# Patient Record
Sex: FEMALE | Race: WHITE | NOT HISPANIC OR LATINO | Employment: UNEMPLOYED | URBAN - METROPOLITAN AREA
[De-identification: names, ages, dates, MRNs, and addresses within clinical notes are randomized per-mention and may not be internally consistent; named-entity substitution may affect disease eponyms.]

---

## 2019-03-14 ENCOUNTER — HOSPITAL ENCOUNTER (OUTPATIENT)
Dept: MAMMOGRAPHY | Facility: HOSPITAL | Age: 49
Discharge: HOME | End: 2019-03-14
Attending: NURSE PRACTITIONER | Admitting: NURSE PRACTITIONER

## 2019-10-15 ENCOUNTER — OFFICE (AMBULATORY)
Dept: URBAN - METROPOLITAN AREA PATHOLOGY 4 | Facility: PATHOLOGY | Age: 49
End: 2019-10-15
Payer: COMMERCIAL

## 2019-10-15 ENCOUNTER — ON CAMPUS - OUTPATIENT (AMBULATORY)
Dept: URBAN - METROPOLITAN AREA HOSPITAL 2 | Facility: HOSPITAL | Age: 49
End: 2019-10-15
Payer: COMMERCIAL

## 2019-10-15 VITALS
SYSTOLIC BLOOD PRESSURE: 130 MMHG | RESPIRATION RATE: 14 BRPM | HEART RATE: 84 BPM | DIASTOLIC BLOOD PRESSURE: 84 MMHG | DIASTOLIC BLOOD PRESSURE: 50 MMHG | SYSTOLIC BLOOD PRESSURE: 96 MMHG | HEART RATE: 74 BPM | HEART RATE: 85 BPM | DIASTOLIC BLOOD PRESSURE: 89 MMHG | SYSTOLIC BLOOD PRESSURE: 119 MMHG | HEART RATE: 68 BPM | WEIGHT: 174.2 LBS | HEART RATE: 70 BPM | HEART RATE: 66 BPM | DIASTOLIC BLOOD PRESSURE: 57 MMHG | OXYGEN SATURATION: 98 % | SYSTOLIC BLOOD PRESSURE: 147 MMHG | DIASTOLIC BLOOD PRESSURE: 47 MMHG | SYSTOLIC BLOOD PRESSURE: 95 MMHG | SYSTOLIC BLOOD PRESSURE: 125 MMHG | DIASTOLIC BLOOD PRESSURE: 52 MMHG | HEIGHT: 64 IN | SYSTOLIC BLOOD PRESSURE: 99 MMHG | DIASTOLIC BLOOD PRESSURE: 59 MMHG | HEART RATE: 75 BPM | OXYGEN SATURATION: 99 % | SYSTOLIC BLOOD PRESSURE: 153 MMHG | SYSTOLIC BLOOD PRESSURE: 115 MMHG | DIASTOLIC BLOOD PRESSURE: 76 MMHG | SYSTOLIC BLOOD PRESSURE: 97 MMHG | RESPIRATION RATE: 16 BRPM | TEMPERATURE: 98.6 F | OXYGEN SATURATION: 100 % | DIASTOLIC BLOOD PRESSURE: 53 MMHG | DIASTOLIC BLOOD PRESSURE: 73 MMHG | HEART RATE: 80 BPM

## 2019-10-15 DIAGNOSIS — Z12.11 ENCOUNTER FOR SCREENING FOR MALIGNANT NEOPLASM OF COLON: ICD-10-CM

## 2019-10-15 DIAGNOSIS — D12.8 BENIGN NEOPLASM OF RECTUM: ICD-10-CM

## 2019-10-15 DIAGNOSIS — K63.5 POLYP OF COLON: ICD-10-CM

## 2019-10-15 PROBLEM — K62.1 RECTAL POLYP: Status: ACTIVE | Noted: 2019-10-15

## 2019-10-15 PROBLEM — D12.5 BENIGN NEOPLASM OF SIGMOID COLON: Status: ACTIVE | Noted: 2019-10-15

## 2019-10-15 LAB
GI HISTOLOGY: A. UNSPECIFIED: (no result)
GI HISTOLOGY: B. UNSPECIFIED: (no result)
GI HISTOLOGY: PDF REPORT: (no result)

## 2019-10-15 PROCEDURE — 45380 COLONOSCOPY AND BIOPSY: CPT | Mod: 33,59 | Performed by: INTERNAL MEDICINE

## 2019-10-15 PROCEDURE — 88305 TISSUE EXAM BY PATHOLOGIST: CPT | Performed by: INTERNAL MEDICINE

## 2019-10-15 PROCEDURE — 45385 COLONOSCOPY W/LESION REMOVAL: CPT | Mod: 33 | Performed by: INTERNAL MEDICINE

## 2020-08-24 ENCOUNTER — OFFICE VISIT (OUTPATIENT)
Dept: CARDIOLOGY | Facility: CLINIC | Age: 50
End: 2020-08-24

## 2020-08-24 VITALS
BODY MASS INDEX: 31.54 KG/M2 | RESPIRATION RATE: 18 BRPM | WEIGHT: 178 LBS | HEIGHT: 63 IN | SYSTOLIC BLOOD PRESSURE: 130 MMHG | DIASTOLIC BLOOD PRESSURE: 77 MMHG | HEART RATE: 82 BPM | OXYGEN SATURATION: 97 %

## 2020-08-24 DIAGNOSIS — R42 DIZZINESS: ICD-10-CM

## 2020-08-24 DIAGNOSIS — Z72.0 TOBACCO USE: ICD-10-CM

## 2020-08-24 DIAGNOSIS — R07.89 CHEST PAIN, ATYPICAL: Primary | ICD-10-CM

## 2020-08-24 DIAGNOSIS — R06.02 SHORTNESS OF BREATH: ICD-10-CM

## 2020-08-24 PROCEDURE — 99204 OFFICE O/P NEW MOD 45 MIN: CPT | Performed by: NURSE PRACTITIONER

## 2020-08-24 RX ORDER — MULTIPLE VITAMINS W/ MINERALS TAB 9MG-400MCG
1 TAB ORAL DAILY
COMMUNITY

## 2020-08-24 RX ORDER — IBUPROFEN 200 MG
200 TABLET ORAL EVERY 6 HOURS PRN
COMMUNITY

## 2020-08-24 RX ORDER — POLYETHYLENE GLYCOL 3350 17 G/17G
17 POWDER, FOR SOLUTION ORAL DAILY
COMMUNITY

## 2020-08-24 RX ORDER — GABAPENTIN 300 MG/1
300 CAPSULE ORAL 3 TIMES DAILY
COMMUNITY

## 2020-08-24 NOTE — PROGRESS NOTES
"Cardiology Office Consultation      Encounter Date:  08/24/2020    Patient ID:   Nicki Amador is a 49 y.o. female.    Reason For Consultation:  Dizziness    History of Present Illness:  Dear Radha Dalton APRN  It was my pleasure to see Ms. Amador in new consultation today.  As you are aware, she is a 49-year-old  female with no known history of ischemic heart disease.  Patient does have history of gastroesophageal reflux disease, elevated serum lipids, recent elevated blood pressure.  She presents today for symptoms of lightheadedness and chest pain.  The patient reports an episode approximately 4 weeks ago when she was getting out of bed and suddenly felt very off balance and lightheaded.  She fell against the wall and was able to finally gain her balance after a few minutes.  She denies any actual presyncopal or syncopal episodes.  She had no chest pains, pressure or palpitations.  She denies any previous diagnosis of vertigo, no nausea or vomiting.  She has noted over the past couple of weeks some shortness with minimal exertion and sensation of her heart \"beating very hard\".  She reports occasional \"pinching sensation\" in her left chest that lasts only a few seconds and subsides.  This may occur with or without activity.  She denies any lower extremity edema or claudication.  The patient has noted slight increase in her blood pressure over the past couple of months with readings as high as 150/90.  She denies any headache, vision change.  Patient does report symptoms of GERD.  She has recent diagnosis of cervical spinal stenosis with episodes of bilateral arm numbness/tingling and is receiving injections.      Assessment & Plan    Impressions:  Chest pain-atypical  Dyspnea with exertion  Lightheadedness  Elevated blood pressure without diagnosis of hypertension  Elevated lipids    Recommendations:  I will schedule patient for nuclear stress testing to evaluate for any evidence of inducible " "ischemia  Patient has had only 2 episodes of lightheadedness-no further.  If patient has any further episodes will order ambulatory monitor  Lipids are being monitored per your office  Consider low-dose antihypertensive if blood pressure remains elevated  Follow-up after stress test    Objective:    Vitals:  Vitals:    08/24/20 1339   BP: 130/77   BP Location: Left arm   Patient Position: Sitting   Cuff Size: Large Adult   Pulse: 82   Resp: 18   SpO2: 97%   Weight: 80.7 kg (178 lb)   Height: 160 cm (63\")       Review of Systems   Cardiovascular: Positive for chest pain, dyspnea on exertion and palpitations.   Musculoskeletal: Positive for back pain.   Neurological: Positive for dizziness and loss of balance.   All other systems reviewed and are negative.      Physical Exam:     General: Alert, cooperative, no distress, appears stated age  Head:  Normocephalic, atraumatic, mucous membranes moist  Eyes:  Conjunctiva/corneas clear, EOM's intact     Neck:  Supple,  no adenopathy;      Lungs: Clear to auscultation bilaterally, no wheezes rhonchi rales are noted  Chest wall: No tenderness  Heart::  Regular rate and rhythm, S1 and S2 normal, no murmur, rub or gallop  Musculoskeletal:   Ambulates freely without assistance  Abdomen: Soft, non-tender, nondistended bowel sounds active  Extremities: No cyanosis, clubbing, or edema  Pulses: 2+ and symmetric all extremities  Skin:  No rashes or lesions  Neuro/psych: A&O x3. CN II through XII are grossly intact with appropriate affect    History of Present Illness      Allergies:  Allergies   Allergen Reactions   • Penicillins Itching and Rash       Medication Review:     Current Outpatient Medications:   •  gabapentin (NEURONTIN) 300 MG capsule, Take 300 mg by mouth 3 (Three) Times a Day., Disp: , Rfl:   •  ibuprofen (ADVIL,MOTRIN) 200 MG tablet, Take 200 mg by mouth Every 6 (Six) Hours As Needed for Mild Pain ., Disp: , Rfl:   •  Multiple Vitamins-Minerals (MULTIVITAMIN WITH " "MINERALS) tablet tablet, Take 1 tablet by mouth Daily., Disp: , Rfl:   •  polyethylene glycol (MIRALAX) packet, Take 17 g by mouth Daily., Disp: , Rfl:     Family History:  Family History   Problem Relation Age of Onset   • Heart attack Mother    • Heart attack Father        Past Medical History:  Past Medical History:   Diagnosis Date   • Hyperlipidemia    • Hypertension        Past surgical History:  Past Surgical History:   Procedure Laterality Date   • INDUCED      • TONSILLECTOMY     • UTERINE SUSPENSION LAPAROSCOPIC         Social History:  Social History     Socioeconomic History   • Marital status:      Spouse name: Not on file   • Number of children: Not on file   • Years of education: Not on file   • Highest education level: Not on file   Tobacco Use   • Smoking status: Current Some Day Smoker     Packs/day: 0.50   • Smokeless tobacco: Never Used   Substance and Sexual Activity   • Alcohol use: Yes     Comment: socially    • Drug use: Yes     Types: Marijuana     Comment: THC gummies       Review of Systems:  The following systems were reviewed as they relate to the cardiovascular system: Constitutional, Eyes, ENT, Cardiovascular, Respiratory, Gastrointestinal, Integumentary, Neurological, Psychiatric, Hematologic, Endocrine, Musculoskeletal, and Genitourinary. The pertinent cardiovascular findings are reported above with all other cardiovascular points within those systems being negative.        NOTE: The following portions of the patient's history were reviewed and updated this visit as appropriate: allergies, current medications, past family history, past medical history, past social history, past surgical history and problem list.    EMR Dragon/Transcription:   \"Dictated utilizing Dragon dictation\".   "

## 2020-09-02 ENCOUNTER — HOSPITAL ENCOUNTER (OUTPATIENT)
Dept: CARDIOLOGY | Facility: HOSPITAL | Age: 50
Discharge: HOME | End: 2020-09-02

## 2020-09-02 ENCOUNTER — HOSPITAL ENCOUNTER (OUTPATIENT)
Dept: CARDIOLOGY | Facility: HOSPITAL | Age: 50
Discharge: HOME | End: 2020-09-02
Admitting: NURSE PRACTITIONER

## 2020-09-02 DIAGNOSIS — R07.89 CHEST PAIN, ATYPICAL: ICD-10-CM

## 2020-09-02 DIAGNOSIS — R42 DIZZINESS: ICD-10-CM

## 2020-09-02 DIAGNOSIS — R06.02 SHORTNESS OF BREATH: ICD-10-CM

## 2020-09-02 LAB
BH CV STRESS BP STAGE 1: NORMAL
BH CV STRESS BP STAGE 2: NORMAL
BH CV STRESS BP STAGE 3: NORMAL
BH CV STRESS DURATION MIN STAGE 1: 3
BH CV STRESS DURATION MIN STAGE 2: 3
BH CV STRESS DURATION MIN STAGE 3: 1
BH CV STRESS DURATION SEC STAGE 1: 0
BH CV STRESS DURATION SEC STAGE 2: 0
BH CV STRESS DURATION SEC STAGE 3: 0
BH CV STRESS GRADE STAGE 1: 10
BH CV STRESS GRADE STAGE 2: 12
BH CV STRESS GRADE STAGE 3: 14
BH CV STRESS HR STAGE 1: 119
BH CV STRESS HR STAGE 2: 144
BH CV STRESS HR STAGE 3: 160
BH CV STRESS METS STAGE 1: 5
BH CV STRESS METS STAGE 2: 7.5
BH CV STRESS METS STAGE 3: 10
BH CV STRESS PROTOCOL 1: NORMAL
BH CV STRESS RECOVERY BP: NORMAL MMHG
BH CV STRESS RECOVERY HR: 88 BPM
BH CV STRESS SPEED STAGE 1: 1.7
BH CV STRESS SPEED STAGE 2: 2.5
BH CV STRESS SPEED STAGE 3: 3.4
BH CV STRESS STAGE 1: 1
BH CV STRESS STAGE 2: 2
BH CV STRESS STAGE 3: 3
LV EF NUC BP: 64 %
MAXIMAL PREDICTED HEART RATE: 170 BPM
PERCENT MAX PREDICTED HR: 94.12 %
STRESS BASELINE BP: NORMAL MMHG
STRESS BASELINE HR: 76 BPM
STRESS PERCENT HR: 111 %
STRESS POST ESTIMATED WORKLOAD: 10.1 METS
STRESS POST EXERCISE DUR MIN: 7 MIN
STRESS POST EXERCISE DUR SEC: 1 SEC
STRESS POST PEAK BP: NORMAL MMHG
STRESS POST PEAK HR: 160 BPM
STRESS TARGET HR: 145 BPM

## 2020-09-02 PROCEDURE — 93017 CV STRESS TEST TRACING ONLY: CPT

## 2020-09-02 PROCEDURE — 0 TECHNETIUM SESTAMIBI: Performed by: NURSE PRACTITIONER

## 2020-09-02 PROCEDURE — 93018 CV STRESS TEST I&R ONLY: CPT | Performed by: INTERNAL MEDICINE

## 2020-09-02 PROCEDURE — A9500 TC99M SESTAMIBI: HCPCS | Performed by: NURSE PRACTITIONER

## 2020-09-02 PROCEDURE — 78452 HT MUSCLE IMAGE SPECT MULT: CPT

## 2020-09-02 PROCEDURE — 78452 HT MUSCLE IMAGE SPECT MULT: CPT | Performed by: INTERNAL MEDICINE

## 2020-09-02 PROCEDURE — 93016 CV STRESS TEST SUPVJ ONLY: CPT | Performed by: INTERNAL MEDICINE

## 2020-09-02 RX ADMIN — TECHNETIUM TC 99M SESTAMIBI 1 DOSE: 1 INJECTION INTRAVENOUS at 09:35

## 2020-09-02 RX ADMIN — TECHNETIUM TC 99M SESTAMIBI 1 DOSE: 1 INJECTION INTRAVENOUS at 08:40

## 2020-09-29 ENCOUNTER — OFFICE VISIT (OUTPATIENT)
Dept: CARDIOLOGY | Facility: CLINIC | Age: 50
End: 2020-09-29

## 2020-09-29 VITALS
SYSTOLIC BLOOD PRESSURE: 122 MMHG | OXYGEN SATURATION: 97 % | DIASTOLIC BLOOD PRESSURE: 83 MMHG | WEIGHT: 176 LBS | BODY MASS INDEX: 31.18 KG/M2 | HEART RATE: 99 BPM

## 2020-09-29 DIAGNOSIS — Z72.0 TOBACCO USE: ICD-10-CM

## 2020-09-29 DIAGNOSIS — R42 DIZZINESS: Primary | ICD-10-CM

## 2020-09-29 DIAGNOSIS — R07.89 CHEST PAIN, ATYPICAL: ICD-10-CM

## 2020-09-29 PROBLEM — R06.02 SHORTNESS OF BREATH: Status: RESOLVED | Noted: 2020-08-24 | Resolved: 2020-09-29

## 2020-09-29 PROCEDURE — 99213 OFFICE O/P EST LOW 20 MIN: CPT | Performed by: NURSE PRACTITIONER

## 2020-09-29 NOTE — PROGRESS NOTES
"  [facility] CARDIOLOGY      REASON FOR FOLLOW-UP:  Follow-up nuclear stress test          Chief Complaint   Patient presents with   • Hypertension     4 week f/u Stress test- pt still having some dizziness    • Hyperlipidemia   • Dizziness         Dear Dr. Jolly,    History of Present Illness   It was my pleasure to see Ms. Amador in new consultation today.  As you are aware, she is a 49-year-old  female with no known history of ischemic heart disease. Patient does have history of gastroesophageal reflux disease, elevated serum lipids, recent elevated blood pressure.  She presented initially for symptoms of lightheadedness and chest pain.  The patient reported an episode approximately 4 weeks ago when she was getting out of bed and suddenly felt very off balance and lightheaded.  She fell against the wall and was able to finally gain her balance after a few minutes.  She denies any actual presyncopal or syncopal episodes.  She had no chest pains, pressure or palpitations.  She denies any previous diagnosis of vertigo, no nausea or vomiting.  She has noted over the past couple of weeks some shortness with minimal exertion and sensation of her heart \"beating very hard\".  She reports occasional \"pinching sensation\" in her left chest that lasts only a few seconds and subsides.  This may occur with or without activity.  She denies any lower extremity edema or claudication.  The patient had noted slight increase in her blood pressure over the past couple of months with readings as high as 150/90.  She denies any headache, vision change.  Patient does report symptoms of GERD.  She has recent diagnosis of cervical spinal stenosis with episodes of bilateral arm numbness/tingling and is receiving injections.  She underwent nuclear stress testing with normal myocardial perfusion study and no evidence of ischemia.  EF estimated at 64%.  Resting anterior wall defect likely secondary to " technical factors.  Low risk study.  She presents today in follow-up for stress testing.    Today, the patient reports no further chest discomfort.  She is receiving injections in her shoulder which is significantly improved her neck and arm pain.  She had one episode of dizziness while leaning forward that resolved quickly.  She reports frequent sinus issues and believes some of her dizziness is related to sinus congestion.  Results of stress testing were reviewed with the patient today.  She stated she probably does not drink enough fluids throughout the day.  Blood pressure in the office today is well controlled at 122/83.         Assessment:  Chest pain-atypical  Dyspnea with exertion  Lightheadedness  Elevated blood pressure without diagnosis of hypertension  Elevated lipids    Plan:  Continue aggressive risk factor modifications  Would appreciate a copy of her most recent labs at your convenience  Continue to monitor blood pressures at home  We will see patient back in 6 months or sooner if needed.        The following portions of the patient's history were reviewed and updated as appropriate: allergies, current medications, past family history, past medical history, past social history, past surgical history and problem list.    REVIEW OF SYSTEMS:    Review of Systems   Musculoskeletal: Positive for joint pain.   Neurological: Positive for dizziness.   All other systems reviewed and are negative.      Vitals:    09/29/20 1306   BP: 122/83   Pulse: 99   SpO2: 97%         PHYSICAL EXAM:    General: Alert, cooperative, no distress, appears stated age  Head:  Normocephalic, atraumatic, mucous membranes moist  Eyes:  Conjunctiva/corneas clear, EOM's intact     Neck:  Supple,  no JVD or bruit     Lungs: Clear to auscultation bilaterally, no wheezes rhonchi rales are noted  Chest wall: No tenderness  Musculoskeletal:   Ambulates freely without assistance  Heart::  Regular rate and rhythm, S1 and S2 normal, no  "murmur, rub or gallop  Abdomen: Soft, non-tender, nondistended, bowel sounds active, no abdominal bruit  Extremities: No cyanosis, clubbing, or edema   Pulses: 2+ and symmetric all extremities  Skin:  No rashes or lesions  Neuro/psych: A&O x3. CN II through XII are grossly intact with appropriate affect        Past Medical History:   Diagnosis Date   • Hyperlipidemia    • Hypertension    • Shortness of breath 2020       Past Surgical History:   Procedure Laterality Date   • INDUCED      • TONSILLECTOMY     • UTERINE SUSPENSION LAPAROSCOPIC           Current Outpatient Medications:   •  gabapentin (NEURONTIN) 300 MG capsule, Take 300 mg by mouth 3 (Three) Times a Day., Disp: , Rfl:   •  ibuprofen (ADVIL,MOTRIN) 200 MG tablet, Take 200 mg by mouth Every 6 (Six) Hours As Needed for Mild Pain ., Disp: , Rfl:   •  Multiple Vitamins-Minerals (MULTIVITAMIN WITH MINERALS) tablet tablet, Take 1 tablet by mouth Daily., Disp: , Rfl:   •  polyethylene glycol (MIRALAX) packet, Take 17 g by mouth Daily., Disp: , Rfl:     Allergies   Allergen Reactions   • Penicillins Itching and Rash       Family History   Problem Relation Age of Onset   • Heart attack Mother    • Heart attack Father        Social History     Tobacco Use   • Smoking status: Current Some Day Smoker     Packs/day: 0.50   • Smokeless tobacco: Never Used   Substance Use Topics   • Alcohol use: Yes     Comment: socially            Current Electrocardiogram:  Procedures        EMR Dragon/Transcription:   \"Dictated utilizing Dragon dictation\".         "

## 2021-04-01 ENCOUNTER — OFFICE VISIT (OUTPATIENT)
Dept: CARDIOLOGY | Facility: CLINIC | Age: 51
End: 2021-04-01

## 2021-04-01 VITALS
WEIGHT: 184 LBS | HEART RATE: 98 BPM | BODY MASS INDEX: 32.59 KG/M2 | OXYGEN SATURATION: 93 % | DIASTOLIC BLOOD PRESSURE: 80 MMHG | SYSTOLIC BLOOD PRESSURE: 117 MMHG

## 2021-04-01 DIAGNOSIS — R42 DIZZINESS: ICD-10-CM

## 2021-04-01 DIAGNOSIS — Z72.0 TOBACCO USE: ICD-10-CM

## 2021-04-01 DIAGNOSIS — R07.89 CHEST PAIN, ATYPICAL: Primary | ICD-10-CM

## 2021-04-01 PROCEDURE — 99213 OFFICE O/P EST LOW 20 MIN: CPT | Performed by: NURSE PRACTITIONER

## 2021-04-01 PROCEDURE — 93000 ELECTROCARDIOGRAM COMPLETE: CPT | Performed by: NURSE PRACTITIONER

## 2021-04-01 NOTE — PROGRESS NOTES
[facility] CARDIOLOGY      REASON FOR FOLLOW-UP:  Chest pain-noncardiac  Elevated blood pressures without the diagnosis of hypertension  Family history of coronary artery disease  Dyslipidemia          Chief Complaint   Patient presents with   • Hypertension     6 mo f/u no cardiac complaints   • Hyperlipidemia         Dear Fidencio Jolly MD        History of Present Illness     It was my pleasure to see Ms. Amador in the office today.  As you are aware, she is a 50-year-old  female with no known history of ischemic heart disease. Patient does have history of gastroesophageal reflux disease, elevated serum lipids, recent elevated blood pressure.  She presented initially for symptoms of lightheadedness and chest pain.  The patient underwent nuclear stress testing with normal myocardial perfusion study and no evidence of ischemia.  EF estimated at 64%.  Resting anterior wall defect likely secondary to technical factors.  Low risk study.  She presents today in follow-up for the above diagnoses.    Today, the patient continues to report pinching sensation in her chest that is unchanged.  She stated she is currently undergoing cervical spine injections and has had continuous pain for the past 3 months.  She brings blood pressure log with her and those are quite good.  She also brings recent lab values with triglycerides 264 and .  She denies any shortness of breath at rest, dyspnea with exertion, orthopnea or PND.  She has occasional mild lower extremity edema that subsides with elevation.  Blood pressure in the office today is well controlled at 117/80.  EKG shows normal sinus rhythm.    Assessment:  Chest pain-atypical  Dyspnea with exertion  Lightheadedness  Elevated blood pressure without diagnosis of hypertension  Dyslipidemia      Plan:  Preventative cardiovascular care and management including diet and exercise, smoking cessation, lipid management  Surveillance labs  at your discretion  Follow-up in 12 months or sooner if needed        The following portions of the patient's history were reviewed and updated as appropriate: allergies, current medications, past family history, past medical history, past social history, past surgical history and problem list.    REVIEW OF SYSTEMS:    Review of Systems   Cardiovascular: Positive for chest pain.   Musculoskeletal:        Neck pain   All other systems reviewed and are negative.      Vitals:    21 1302   BP: 117/80   Pulse: 98   SpO2: 93%         PHYSICAL EXAM:    General: Alert, cooperative, no distress, appears stated age  Head:  Normocephalic, atraumatic, mucous membranes moist  Eyes:  Conjunctiva/corneas clear, EOM's intact     Neck:  Supple,  no JVD or bruit     Lungs: Clear to auscultation bilaterally, no wheezes rhonchi rales are noted  Chest wall: No tenderness  Musculoskeletal:   Ambulates freely without assistance  Heart::  Regular rate and rhythm, S1 and S2 normal, no murmur, rub or gallop  Abdomen: Soft, non-tender, nondistended, bowel sounds active, no abdominal bruit  Extremities: No cyanosis, clubbing, or edema   Pulses: 2+ and symmetric all extremities  Skin:  No rashes or lesions  Neuro/psych: A&O x3. CN II through XII are grossly intact with appropriate affect        Past Medical History:   Diagnosis Date   • Hyperlipidemia    • Hypertension    • Shortness of breath 2020       Past Surgical History:   Procedure Laterality Date   • INDUCED      • TONSILLECTOMY     • UTERINE SUSPENSION LAPAROSCOPIC           Current Outpatient Medications:   •  gabapentin (NEURONTIN) 300 MG capsule, Take 300 mg by mouth 3 (Three) Times a Day., Disp: , Rfl:   •  ibuprofen (ADVIL,MOTRIN) 200 MG tablet, Take 200 mg by mouth Every 6 (Six) Hours As Needed for Mild Pain ., Disp: , Rfl:   •  Multiple Vitamins-Minerals (MULTIVITAMIN WITH MINERALS) tablet tablet, Take 1 tablet by mouth Daily., Disp: , Rfl:   •  polyethylene  "glycol (MIRALAX) packet, Take 17 g by mouth Daily., Disp: , Rfl:     Allergies   Allergen Reactions   • Penicillins Itching and Rash       Family History   Problem Relation Age of Onset   • Heart attack Mother    • Heart attack Father        Social History     Tobacco Use   • Smoking status: Current Some Day Smoker     Packs/day: 1.00   • Smokeless tobacco: Never Used   Substance Use Topics   • Alcohol use: Yes     Comment: socially            Current Electrocardiogram:    ECG 12 Lead    Date/Time: 4/1/2021 1:27 PM  Performed by: Audrey Kirk APRN  Authorized by: Audrey Kirk APRN   Comparison: not compared with previous ECG   Rhythm: sinus rhythm  BPM: 98                  EMR Dragon/Transcription:   \"Dictated utilizing Dragon dictation\".       "

## 2022-04-04 ENCOUNTER — OFFICE VISIT (OUTPATIENT)
Dept: CARDIOLOGY | Facility: CLINIC | Age: 52
End: 2022-04-04

## 2022-04-04 VITALS
SYSTOLIC BLOOD PRESSURE: 135 MMHG | OXYGEN SATURATION: 99 % | HEART RATE: 91 BPM | DIASTOLIC BLOOD PRESSURE: 84 MMHG | HEIGHT: 63 IN | BODY MASS INDEX: 29.77 KG/M2 | WEIGHT: 168 LBS

## 2022-04-04 DIAGNOSIS — R07.89 CHEST PAIN, ATYPICAL: Primary | ICD-10-CM

## 2022-04-04 DIAGNOSIS — R42 DIZZINESS: ICD-10-CM

## 2022-04-04 DIAGNOSIS — R53.83 OTHER FATIGUE: ICD-10-CM

## 2022-04-04 DIAGNOSIS — Z72.0 TOBACCO USE: ICD-10-CM

## 2022-04-04 PROCEDURE — 93000 ELECTROCARDIOGRAM COMPLETE: CPT | Performed by: NURSE PRACTITIONER

## 2022-04-04 PROCEDURE — 99213 OFFICE O/P EST LOW 20 MIN: CPT | Performed by: NURSE PRACTITIONER

## 2022-04-04 NOTE — PROGRESS NOTES
[facility] CARDIOLOGY      REASON FOR FOLLOW-UP:  Chest pain-noncardiac  Elevated blood pressures without the diagnosis of hypertension  Family history of coronary artery disease  Dyslipidemia          Chief Complaint   Patient presents with   • Hypertension   • Hyperlipidemia         Dear Fidencio Jolly MD        History of Present Illness     It was my pleasure to see Ms. Amador in the office today.  As you are aware, she is a 51-year-old female with no known history of ischemic heart disease. Patient does have history of gastroesophageal reflux disease, elevated serum lipids, recent elevated blood pressure.  She presented initially and August 2020 for symptoms of lightheadedness and chest pain.  She underwent nuclear stress testing with normal myocardial perfusion study and no evidence of ischemia.  EF estimated at 64%.  Resting anterior wall defect likely secondary to technical factors given normal wall motion.  She presents today in follow-up for the above-mentioned diagnoses.    Today, Ms. Amador reports that she contracted COVID-19 infection in January of this year with shortness of breath on exertion and intermittent elevated heart rates with activity.  She has had some ongoing symptoms of fatigue and lack of energy.  She did have some chest tightness during her actual infectious process, but that has subsided.  She denies any shortness of breath, dyspnea on exertion, dizziness or lightheadedness.  She has had no lower extremity edema.  She has had no further chest discomfort.  EKG in the office today shows normal sinus rhythm    ASSESSMENT:  Chest pain-atypical  Shortness of breath on exertion  Elevated blood pressure without diagnosis of hypertension  Dyslipidemia        PLAN:  Preventative cardiovascular care and management including diet and exercise, recommend smoking cessation.  Surveillance labs at your discretion  Follow-up in 12 months or sooner if needed        The  following portions of the patient's history were reviewed and updated as appropriate: allergies, current medications, past family history, past medical history, past social history, past surgical history and problem list.    REVIEW OF SYSTEMS:    Review of Systems   Constitutional: Positive for malaise/fatigue.   All other systems reviewed and are negative.      Vitals:    22 1259   BP: 135/84   Pulse: 91   SpO2: 99%         PHYSICAL EXAM:    General: Alert, cooperative, no distress, appears stated age  Head:  Normocephalic, atraumatic, mucous membranes moist  Eyes:  Conjunctiva/corneas clear, EOM's intact     Neck:  Supple,  no JVD or bruit     Lungs: Clear to auscultation bilaterally, no wheezes rhonchi rales are noted  Chest wall: No tenderness  Musculoskeletal:   Ambulates freely without assistance  Heart::  Regular rate and rhythm, S1 and S2 normal, no murmur, rub or gallop  Abdomen: Soft, non-tender, nondistended, bowel sounds active, no abdominal bruit  Extremities: No cyanosis, clubbing, or edema   Pulses: 2+ and symmetric all extremities  Skin:  No rashes or lesions  Neuro/psych: A&O x3. CN II through XII are grossly intact with appropriate affect        Past Medical History:   Diagnosis Date   • Hyperlipidemia    • Hypertension    • Shortness of breath 2020       Past Surgical History:   Procedure Laterality Date   • INDUCED      • TONSILLECTOMY     • UTERINE SUSPENSION LAPAROSCOPIC           Current Outpatient Medications:   •  gabapentin (NEURONTIN) 300 MG capsule, Take 300 mg by mouth 3 (Three) Times a Day., Disp: , Rfl:   •  ibuprofen (ADVIL,MOTRIN) 200 MG tablet, Take 200 mg by mouth Every 6 (Six) Hours As Needed for Mild Pain ., Disp: , Rfl:   •  Multiple Vitamins-Minerals (MULTIVITAMIN WITH MINERALS) tablet tablet, Take 1 tablet by mouth Daily., Disp: , Rfl:   •  polyethylene glycol (MIRALAX) packet, Take 17 g by mouth Daily., Disp: , Rfl:     Allergies   Allergen Reactions   •  "Penicillins Itching and Rash       Family History   Problem Relation Age of Onset   • Heart attack Mother    • Heart attack Father        Social History     Tobacco Use   • Smoking status: Current Some Day Smoker     Packs/day: 1.00   • Smokeless tobacco: Never Used   Substance Use Topics   • Alcohol use: Yes     Comment: socially            Current Electrocardiogram:    ECG 12 Lead    Date/Time: 4/4/2022 1:46 PM  Performed by: Audrey Kirk APRN  Authorized by: Audrey Kirk APRN   Comparison: compared with previous ECG from 4/1/2021  Similar to previous ECG  Rhythm: sinus rhythm  BPM: 91                  EMR Dragon/Transcription:   \"Dictated utilizing Dragon dictation\".       "

## 2023-10-09 ENCOUNTER — TREATMENT (OUTPATIENT)
Dept: PHYSICAL THERAPY | Facility: CLINIC | Age: 53
End: 2023-10-09
Payer: COMMERCIAL

## 2023-10-09 DIAGNOSIS — M16.0 OSTEOARTHRITIS OF BOTH HIPS, UNSPECIFIED OSTEOARTHRITIS TYPE: Primary | ICD-10-CM

## 2023-10-09 DIAGNOSIS — M70.71 BURSITIS OF RIGHT HIP, UNSPECIFIED BURSA: ICD-10-CM

## 2023-10-09 DIAGNOSIS — M25.551 BILATERAL HIP PAIN: ICD-10-CM

## 2023-10-09 DIAGNOSIS — R26.2 DIFFICULTY WALKING: ICD-10-CM

## 2023-10-09 DIAGNOSIS — M25.552 BILATERAL HIP PAIN: ICD-10-CM

## 2023-10-09 PROCEDURE — 97140 MANUAL THERAPY 1/> REGIONS: CPT | Performed by: PHYSICAL THERAPIST

## 2023-10-09 PROCEDURE — 97161 PT EVAL LOW COMPLEX 20 MIN: CPT | Performed by: PHYSICAL THERAPIST

## 2023-10-09 NOTE — PROGRESS NOTES
"Physical Therapy Initial Evaluation and Plan of Care  [address] [address]    Patient: Nicki Amador   : 1970  Diagnosis/ICD-10 Code:  Osteoarthritis of both hips, unspecified osteoarthritis type [M16.0]  Referring practitioner: Akash Bahena MD  Date of Initial Visit: 10/9/2023  Today's Date: 10/9/2023  Patient seen for 1 session         Visit Diagnoses:     ICD-10-CM ICD-9-CM   1. Osteoarthritis of both hips, unspecified osteoarthritis type  M16.0 715.95   2. Bursitis of right hip, unspecified bursa  M70.71 726.5   3. Bilateral hip pain  M25.551 719.45    M25.552    4. Difficulty walking  R26.2 719.7       Subjective Questionnaire: LEFS: 37/80 points    Subjective Evaluation    History of Present Illness  Mechanism of injury: Patient presents to physical therapy with orders to address bilateral hip pain due to bilateral osteoarthritis and bursitis of the right hip. Her hip pain started on 7-8 weeks ago that had insidious onset. Approximately a year ago she was trying to get into bed and felt like she moved wrong. She reports that her hip hasn't felt the same since then. She got an injection approximately 3 weeks ago in her right hip and reports that her hip pain is better. Currently her right hip is the one that is hurting.     Her pain goes from her right low back (which feels like muscle tightness to her), to her right pelvis (which is tender), and her right groin (which only hurts when she starts doing activities).     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 Her work gave her a sit to stand desk so she can stand during work day with support. She has increased pain after prolonged sitting.     The patient has difficulty doing laundry and dishes. She says that she is unable to do anything past that due to how the other activities have gone poorly. Currently she states that sleep is going \"fine\".     The patient " "would like to get back to being able to hike, be active with her 4 year old granddaughter, do household cleaning, and bend over and squat without being in pain.     She previously went to PT for shoulder and neck pain but only received 5 visits. She felt like PT wasn't helping but states that it was due to her only having 5 visits. She is not currently doing her previous HEP due to work and length of HEP (2.5 hours in total).     Medial history: hyperlipidemia, hypertension,   Medication allergies: Penicillin      Patient Occupation:  (does computer work) Pain  Current pain ratin  At best pain ratin  At worst pain rating: 10 (\" when it was going\")  Location: Low back, pelvic, groin  Quality: sharp (and tender)  Relieving factors: ice and medications (asprin)  Aggravating factors: repetitive movement, movement, ambulation, stairs, squatting, prolonged positioning, standing and lifting (prolonged sitting)  Progression: improved (since injection)    Social Support  Lives in: one-story house (one little step into the shower)  Lives with: significant other (boyfriend)    Treatments  Previous treatment: physical therapy (for shoulder & neck)  Current treatment: injection treatment and physical therapy  Patient Goals  Patient goals for therapy: decreased pain, increased motion, increased strength, return to sport/leisure activities, improved balance and independence with ADLs/IADLs  Patient goal: return to hiking, be active with her 4 year old granddaughter         Objective          Static Posture     Pelvis   Pelvis (Right): Obliquity.     Comments  There was a slight leg length discrepancy with the right leg being shorter.      Postural Observations  Seated posture: fair  Standing posture: fair      Palpation   Left   Hypertonic in the erector spinae, lumbar paraspinals and quadratus lumborum.   Tenderness of the quadratus lumborum.     Right   Hypertonic in the erector spinae, lumbar " paraspinals, quadratus lumborum and rectus femoris. Tenderness of the gluteus cheryl, gluteus medius, piriformis and quadratus lumborum.     Active Range of Motion     Lumbar   Flexion: 75 (pain only at 45 deg) degrees   Extension: 5 (felt tightness) degrees   Left Hip   Flexion: 108 degrees     Right Hip   Flexion: 104 degrees     Passive Range of Motion   Left Hip   External rotation (90/90): 60 degrees   Internal rotation (90/90): 25 degrees     Right Hip   External rotation (90/90): 48 degrees   Internal rotation (90/90): 24 degrees     Strength/Myotome Testing     Left Hip   Planes of Motion   Flexion: 4    Right Hip   Planes of Motion   Flexion: 3+    Left Knee   Flexion: 4+  Extension: 4+    Right Knee   Flexion: 4  Extension: 4    Left Ankle/Foot   Dorsiflexion: 4+  Plantar flexion: 3+    Right Ankle/Foot   Dorsiflexion: 4+  Plantar flexion: 3+    Tests     Additional Tests Details  Positive Stork Test Bilat    Ambulation     Observational Gait   Gait: antalgic      General Comments     Hip Comments   Bilateral limited internal rotation      Patient Education: Discussed treatment plan and initiated HEP with handout and [employer] access code: [email or web address]    Assessment & Plan       Assessment  Impairments: abnormal gait, abnormal muscle firing, abnormal muscle tone, abnormal or restricted ROM, activity intolerance, impaired balance, impaired physical strength, lacks appropriate home exercise program, pain with function and weight-bearing intolerance   Functional limitations: carrying objects, lifting, sleeping, walking, pulling, pushing, uncomfortable because of pain, sitting, standing, stooping, reaching overhead and unable to perform repetitive tasks   Assessment details: The patient is a 52 y.o. female who presents to physical therapy today for bilateral hip pain that is primarily on her right side. Upon initial evaluation, the patient demonstrates the following impairments: abnormal muscle firing, muscle  tone, gait, and ROM; activity and weight bearing intolerance; impaired balance and strength; lacking an appropriate home exercise program; and pain with function. Due to these impairments, the patient is unable to carry objects, push or pull, sit for prolonged periods of time, stoop, stand, reach overhead, obtain quality sleep, walk, lift, perform repetitive tasks, and is uncomfortable because of the pain. The patient would benefit from skilled PT services to address functional limitations and impairments and to improve patient quality of life.     Barriers to therapy: potential limitation in insurance covered visits    Goals  Plan Goals: STG's: 3 weeks  Patient will report a decrease in maximum pain by 25%   Patient will be able to pick a light (<5#) object off the floor without an increase in pain  Patient will be able to tolerate standing for >/= 15 minutes without increased pain  Patient will be able to perform HEP with minimal verbal cues    LTG's: By discharge  Patient will report a decrease in maximum pain by 50%  Patient will demonstrate an improvement in overall function as measured by an improvement in the LEFS score by >/= 10 points   Patient will be able to pick a mildly heavy (approximately 15#) object off the floor without an increase in pain in order to be able to lift a filled laundry basket or cleaning supplies  Patient will be able to stoop for >/= 20 minutes at a time without increased pain in order to clean around the house  Patient will be able to tolerate standing for >/= 45 minutes without increased pain in order to be able to stand at work or do tasks around the home  Patient will be able to tolerate sitting for >/= 90 minutes without increased pain in order to be able to sit for longer at work  Patient will be able to hike on a relatively mildly difficult trail for >/= 30 minutes without increased pain  Patient will be able to be active with her granddaughter for >/= 30 minutes at a time  without increased pain  Patient will be independent with final HEP      Plan  Therapy options: will be seen for skilled therapy services  Planned modality interventions: cryotherapy, dry needling, electrical stimulation/Russian stimulation, TENS, thermotherapy (hydrocollator packs), hydrotherapy and ultrasound  Planned therapy interventions: balance/weight-bearing training, body mechanics training, flexibility, functional ROM exercises, home exercise program, IADL retraining, gait training, manual therapy, neuromuscular re-education, postural training, soft tissue mobilization, strengthening, stretching, therapeutic activities, ADL retraining, motor coordination training and abdominal trunk stabilization  Frequency: 2x week  Duration in weeks: 12  Treatment plan discussed with: patient        History # of Personal Factors and/or Comorbidities: MODERATE (1-2)  Examination of Body System(s): # of elements: LOW (1-2)  Clinical Presentation: EVOLVING  Clinical Decision Making: LOW     I was present in the room guiding the student, directing the service, and making the skilled judgement for all services rendered.    Timed:         Manual Therapy:    10     mins  90325;     Therapeutic Exercise:      5   mins  79599;     Neuromuscular Ricardo:        mins  64387;    Therapeutic Activity:          mins  20839;     Gait Training:           mins  75383;     Ultrasound:          mins  18681;    Ionto                                   mins   32894  Self Care                            mins   93419    Un-Timed:  Electrical Stimulation:         mins  49344 ( );  Canalith Repos         mins 26479  Dry Needling          mins 20812/10060  Traction          mins 14957  Low Eval     35     Mins  87187  Mod Eval          Mins  64472  High Eval                            Mins  71153    No Charge:   Cryopack                         mins  Moist Heat                       mins      Timed Treatment:   15   mins   Total Treatment:      50   mins        Monica Tian, Student Physical Therapist        PT SIGNATURE: Selina Bowen PT   [place] License: [id number]  DATE TREATMENT INITIATED: 10/9/2023    Initial Certification  Certification Period: 10/9/2023 thru 1/6/2024  I certify that the therapy services are furnished while this patient is under my care.  The services outlined above are required by this patient, and will be reviewed every 90 days.      Physician Signature: _________________________  PHYSICIAN: Akash Bahena MD   NPI: [id number]                                             DATE: _____________________________________    Please sign and return via fax to [phone]. Thank you, [facility].

## 2023-10-09 NOTE — LETTER
"Physical Therapy Initial Evaluation and Plan of Care  [address] [address]    Patient: Nicki Amador   : 1970  Diagnosis/ICD-10 Code:  Osteoarthritis of both hips, unspecified osteoarthritis type [M16.0]  Referring practitioner: Akash Bahena MD  Date of Initial Visit: 10/9/2023  Today's Date: 10/9/2023  Patient seen for 1 session         Visit Diagnoses:     ICD-10-CM ICD-9-CM   1. Osteoarthritis of both hips, unspecified osteoarthritis type  M16.0 715.95   2. Bursitis of right hip, unspecified bursa  M70.71 726.5   3. Bilateral hip pain  M25.551 719.45    M25.552    4. Difficulty walking  R26.2 719.7       Subjective Questionnaire: LEFS: 37/80 points    Subjective Evaluation    History of Present Illness  Mechanism of injury: Patient presents to physical therapy with orders to address bilateral hip pain due to bilateral osteoarthritis and bursitis of the right hip. Her hip pain started on 7-8 weeks ago that had insidious onset. Approximately a year ago she was trying to get into bed and felt like she moved wrong. She reports that her hip hasn't felt the same since then. She got an injection approximately 3 weeks ago in her right hip and reports that her hip pain is better. Currently her right hip is the one that is hurting.     Her pain goes from her right low back (which feels like muscle tightness to her), to her right pelvis (which is tender), and her right groin (which only hurts when she starts doing activities).     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 Her work gave her a sit to stand desk so she can stand during work day with support. She has increased pain after prolonged sitting.     The patient has difficulty doing laundry and dishes. She says that she is unable to do anything past that due to how the other activities have gone poorly. Currently she states that sleep is going \"fine\".     The patient " "would like to get back to being able to hike, be active with her 4 year old granddaughter, do household cleaning, and bend over and squat without being in pain.     She previously went to PT for shoulder and neck pain but only received 5 visits. She felt like PT wasn't helping but states that it was due to her only having 5 visits. She is not currently doing her previous HEP due to work and length of HEP (2.5 hours in total).     Medial history: hyperlipidemia, hypertension,   Medication allergies: Penicillin      Patient Occupation:  (does computer work) Pain  Current pain ratin  At best pain ratin  At worst pain rating: 10 (\" when it was going\")  Location: Low back, pelvic, groin  Quality: sharp (and tender)  Relieving factors: ice and medications (asprin)  Aggravating factors: repetitive movement, movement, ambulation, stairs, squatting, prolonged positioning, standing and lifting (prolonged sitting)  Progression: improved (since injection)    Social Support  Lives in: one-story house (one little step into the shower)  Lives with: significant other (boyfriend)    Treatments  Previous treatment: physical therapy (for shoulder & neck)  Current treatment: injection treatment and physical therapy  Patient Goals  Patient goals for therapy: decreased pain, increased motion, increased strength, return to sport/leisure activities, improved balance and independence with ADLs/IADLs  Patient goal: return to hiking, be active with her 4 year old granddaughter         Objective          Static Posture     Pelvis   Pelvis (Right): Obliquity.     Comments  There was a slight leg length discrepancy with the right leg being shorter.      Postural Observations  Seated posture: fair  Standing posture: fair      Palpation   Left   Hypertonic in the erector spinae, lumbar paraspinals and quadratus lumborum.   Tenderness of the quadratus lumborum.     Right   Hypertonic in the erector spinae, lumbar " paraspinals, quadratus lumborum and rectus femoris. Tenderness of the gluteus cheryl, gluteus medius, piriformis and quadratus lumborum.     Active Range of Motion     Lumbar   Flexion: 75 (pain only at 45 deg) degrees   Extension: 5 (felt tightness) degrees   Left Hip   Flexion: 108 degrees     Right Hip   Flexion: 104 degrees     Passive Range of Motion   Left Hip   External rotation (90/90): 60 degrees   Internal rotation (90/90): 25 degrees     Right Hip   External rotation (90/90): 48 degrees   Internal rotation (90/90): 24 degrees     Strength/Myotome Testing     Left Hip   Planes of Motion   Flexion: 4    Right Hip   Planes of Motion   Flexion: 3+    Left Knee   Flexion: 4+  Extension: 4+    Right Knee   Flexion: 4  Extension: 4    Left Ankle/Foot   Dorsiflexion: 4+  Plantar flexion: 3+    Right Ankle/Foot   Dorsiflexion: 4+  Plantar flexion: 3+    Tests     Additional Tests Details  Positive Stork Test Bilat    Ambulation     Observational Gait   Gait: antalgic      General Comments     Hip Comments   Bilateral limited internal rotation      Patient Education: Discussed treatment plan and initiated HEP with handout and [employer] access code: [email or web address]    Assessment & Plan       Assessment  Impairments: abnormal gait, abnormal muscle firing, abnormal muscle tone, abnormal or restricted ROM, activity intolerance, impaired balance, impaired physical strength, lacks appropriate home exercise program, pain with function and weight-bearing intolerance   Functional limitations: carrying objects, lifting, sleeping, walking, pulling, pushing, uncomfortable because of pain, sitting, standing, stooping, reaching overhead and unable to perform repetitive tasks   Assessment details: The patient is a 52 y.o. female who presents to physical therapy today for bilateral hip pain that is primarily on her right side. Upon initial evaluation, the patient demonstrates the following impairments: abnormal muscle firing, muscle  tone, gait, and ROM; activity and weight bearing intolerance; impaired balance and strength; lacking an appropriate home exercise program; and pain with function. Due to these impairments, the patient is unable to carry objects, push or pull, sit for prolonged periods of time, stoop, stand, reach overhead, obtain quality sleep, walk, lift, perform repetitive tasks, and is uncomfortable because of the pain. The patient would benefit from skilled PT services to address functional limitations and impairments and to improve patient quality of life.     Barriers to therapy: potential limitation in insurance covered visits    Goals  Plan Goals: STG's: 3 weeks  Patient will report a decrease in maximum pain by 25%   Patient will be able to pick a light (<5#) object off the floor without an increase in pain  Patient will be able to tolerate standing for >/= 15 minutes without increased pain  Patient will be able to perform HEP with minimal verbal cues    LTG's: By discharge  Patient will report a decrease in maximum pain by 50%  Patient will demonstrate an improvement in overall function as measured by an improvement in the LEFS score by >/= 10 points   Patient will be able to pick a mildly heavy (approximately 15#) object off the floor without an increase in pain in order to be able to lift a filled laundry basket or cleaning supplies  Patient will be able to stoop for >/= 20 minutes at a time without increased pain in order to clean around the house  Patient will be able to tolerate standing for >/= 45 minutes without increased pain in order to be able to stand at work or do tasks around the home  Patient will be able to tolerate sitting for >/= 90 minutes without increased pain in order to be able to sit for longer at work  Patient will be able to hike on a relatively mildly difficult trail for >/= 30 minutes without increased pain  Patient will be able to be active with her granddaughter for >/= 30 minutes at a time  without increased pain  Patient will be independent with final HEP      Plan  Therapy options: will be seen for skilled therapy services  Planned modality interventions: cryotherapy, dry needling, electrical stimulation/Russian stimulation, TENS, thermotherapy (hydrocollator packs), hydrotherapy and ultrasound  Planned therapy interventions: balance/weight-bearing training, body mechanics training, flexibility, functional ROM exercises, home exercise program, IADL retraining, gait training, manual therapy, neuromuscular re-education, postural training, soft tissue mobilization, strengthening, stretching, therapeutic activities, ADL retraining, motor coordination training and abdominal trunk stabilization  Frequency: 2x week  Duration in weeks: 12  Treatment plan discussed with: patient        History # of Personal Factors and/or Comorbidities: MODERATE (1-2)  Examination of Body System(s): # of elements: LOW (1-2)  Clinical Presentation: EVOLVING  Clinical Decision Making: LOW     I was present in the room guiding the student, directing the service, and making the skilled judgement for all services rendered.    Timed:         Manual Therapy:    10     mins  61405;     Therapeutic Exercise:      5   mins  69369;     Neuromuscular Ricardo:        mins  57350;    Therapeutic Activity:          mins  45069;     Gait Training:           mins  12533;     Ultrasound:          mins  49026;    Ionto                                   mins   58804  Self Care                            mins   94530    Un-Timed:  Electrical Stimulation:         mins  79913 ( );  Canalith Repos         mins 69243  Dry Needling          mins 02319/16918  Traction          mins 63913  Low Eval     35     Mins  58660  Mod Eval          Mins  88269  High Eval                            Mins  19821    No Charge:   Cryopack                         mins  Moist Heat                       mins      Timed Treatment:   15   mins   Total Treatment:      50   mins        Monica Tian, Student Physical Therapist        PT SIGNATURE: Selina Bowen PT   [place] License: [id number]  DATE TREATMENT INITIATED: 10/9/2023    Initial Certification  Certification Period: 10/9/2023 thru 1/6/2024  I certify that the therapy services are furnished while this patient is under my care.  The services outlined above are required by this patient, and will be reviewed every 90 days.      Physician Signature: _________________________  PHYSICIAN: Akash Bahena MD   NPI: [id number]                                             DATE: _____________________________________    Please sign and return via fax to [phone]. Thank you, [facility].

## 2023-10-11 ENCOUNTER — TREATMENT (OUTPATIENT)
Dept: PHYSICAL THERAPY | Facility: CLINIC | Age: 53
End: 2023-10-11
Payer: COMMERCIAL

## 2023-10-11 DIAGNOSIS — M16.0 OSTEOARTHRITIS OF BOTH HIPS, UNSPECIFIED OSTEOARTHRITIS TYPE: Primary | ICD-10-CM

## 2023-10-11 DIAGNOSIS — M25.551 BILATERAL HIP PAIN: ICD-10-CM

## 2023-10-11 DIAGNOSIS — M70.71 BURSITIS OF RIGHT HIP, UNSPECIFIED BURSA: ICD-10-CM

## 2023-10-11 DIAGNOSIS — M25.552 BILATERAL HIP PAIN: ICD-10-CM

## 2023-10-11 DIAGNOSIS — R26.2 DIFFICULTY WALKING: ICD-10-CM

## 2023-10-11 PROCEDURE — 97110 THERAPEUTIC EXERCISES: CPT | Performed by: PHYSICAL THERAPIST

## 2023-10-11 NOTE — PROGRESS NOTES
Physical Therapy Daily Treatment Note  [address] [place] [address]      Patient: Nicki Amador  : 1970  Referring Practitioner: Akash Bahena MD  Date of Initial Visit: Type: THERAPY  Noted: 10/9/2023  Today's Date: 10/11/2023  Patient seen for: 2 sessions      Visit Diagnoses:     ICD-10-CM ICD-9-CM   1. Osteoarthritis of both hips, unspecified osteoarthritis type  M16.0 715.95   2. Bursitis of right hip, unspecified bursa  M70.71 726.5   3. Bilateral hip pain  M25.551 719.45    M25.552    4. Difficulty walking  R26.2 719.7       Subjective   Nicki Amador reports: she is doing well, pain is minimal today. States she was sore following, but that has since improved. She reports that she has been compliant with her initial HEP, and can tell it is helping.     Pain Level (0-10): 0    Objective     See Exercise, Manual, and Modality Logs for complete treatment.     Patient Education: cues for form and addition of ITB off side of bed to HEP.     Assessment & Plan       Assessment  Assessment details: Nicki presents to PT voicing overall improvements in pain since having injection, but does report muscle soreness and quick fatigue. Progressed therex today with TrA engagement. Also added ITB stretch as pt reporting increased soreness on the lateral aspect of the R hip. Continue to progress as tolerated.       Progress per Plan of Care and Progress strengthening /stabilization /functional activity          Timed:            Manual Therapy:         mins  76128;     Therapeutic Exercise:    25     mins  34784;     Neuromuscular Ricardo:        mins  01695;    Therapeutic Activity:     10     mins  66968;     Gait Training:           mins  96040;     Ultrasound:          mins  33984;    Ionto                                   mins   74699  Self Care                            mins   41770      Un-Timed:  Electrical Stimulation:         mins  86462 ( );  Traction          mins  59069    No Charge:   Cryopack:        mins  Hydrocollator MHP:         mins      Timed Treatment:   35   mins   Total Treatment:     35   mins      Charo Quiñones PTA  Physical Therapist Assistant  [place] License: [id number]

## 2023-10-16 ENCOUNTER — TREATMENT (OUTPATIENT)
Dept: PHYSICAL THERAPY | Facility: CLINIC | Age: 53
End: 2023-10-16
Payer: COMMERCIAL

## 2023-10-16 DIAGNOSIS — M25.552 BILATERAL HIP PAIN: ICD-10-CM

## 2023-10-16 DIAGNOSIS — M16.0 OSTEOARTHRITIS OF BOTH HIPS, UNSPECIFIED OSTEOARTHRITIS TYPE: Primary | ICD-10-CM

## 2023-10-16 DIAGNOSIS — R26.2 DIFFICULTY WALKING: ICD-10-CM

## 2023-10-16 DIAGNOSIS — M25.551 BILATERAL HIP PAIN: ICD-10-CM

## 2023-10-16 DIAGNOSIS — M70.71 BURSITIS OF RIGHT HIP, UNSPECIFIED BURSA: ICD-10-CM

## 2023-10-16 PROCEDURE — 97530 THERAPEUTIC ACTIVITIES: CPT | Performed by: PHYSICAL THERAPIST

## 2023-10-16 PROCEDURE — 97110 THERAPEUTIC EXERCISES: CPT | Performed by: PHYSICAL THERAPIST

## 2023-10-16 PROCEDURE — 97112 NEUROMUSCULAR REEDUCATION: CPT | Performed by: PHYSICAL THERAPIST

## 2023-10-16 NOTE — PROGRESS NOTES
Physical Therapy Daily Treatment Note  [address] [place] [address]      Patient: Nicki Amador  : 1970  Referring Practitioner: Akash Bahena MD  Date of Initial Visit: Type: THERAPY  Noted: 10/9/2023  Today's Date: 10/16/2023  Patient seen for: 3 sessions      Visit Diagnoses:     ICD-10-CM ICD-9-CM   1. Osteoarthritis of both hips, unspecified osteoarthritis type  M16.0 715.95   2. Bursitis of right hip, unspecified bursa  M70.71 726.5   3. Bilateral hip pain  M25.551 719.45    M25.552    4. Difficulty walking  R26.2 719.7       Subjective   Nicki Amador reports she had a very busy weekend between her birthday, fall festivities and general house work. She reports that she was able to perform her HEP x 1, but was not able to do more than that. She was able to complete 3 full loads of laundry before experiencing pain, which is an improvement overall. As of right now, she has 0 pain.     Pain Level (0-10): 0    Objective     See Exercise, Manual, and Modality Logs for complete treatment.     Patient Education: cues for form and HEP progression today. Anatomy of iliopsoas and how it can cause discomfort on the anterolateral portion of the pelvis and spine.     Assessment & Plan       Assessment  Assessment details: Nicki presents to PT voicing that she is pain free today! She is able to perform current ex regimen with focus on NMC of core stabilizers with dynamic motions. HEP progressed. If she continues to remain pain free, plan is to further progress to next level of core stabilizing exs.        Progress per Plan of Care and Progress strengthening /stabilization /functional activity          Timed:            Manual Therapy:         mins  21949;     Therapeutic Exercise:    15     mins  00018;     Neuromuscular Ricardo:   13     mins  44170;    Therapeutic Activity:     10     mins  03443;     Gait Training:           mins  00518;     Ultrasound:          mins  78036;     Ionto                                   mins   03569  Self Care                            mins   51639      Un-Timed:  Electrical Stimulation:         mins  78092 ( );  Traction          mins 07123    No Charge:   Cryopack:        mins  Hydrocollator MHP:         mins      Timed Treatment:   38   mins   Total Treatment:     38   mins      Charo Quiñones PTA  Physical Therapist Assistant  [place] License: [id number]

## 2023-10-23 ENCOUNTER — TREATMENT (OUTPATIENT)
Dept: PHYSICAL THERAPY | Facility: CLINIC | Age: 53
End: 2023-10-23
Payer: COMMERCIAL

## 2023-10-23 DIAGNOSIS — M70.71 BURSITIS OF RIGHT HIP, UNSPECIFIED BURSA: ICD-10-CM

## 2023-10-23 DIAGNOSIS — M25.551 BILATERAL HIP PAIN: ICD-10-CM

## 2023-10-23 DIAGNOSIS — M25.552 BILATERAL HIP PAIN: ICD-10-CM

## 2023-10-23 DIAGNOSIS — R26.2 DIFFICULTY WALKING: ICD-10-CM

## 2023-10-23 DIAGNOSIS — M16.0 OSTEOARTHRITIS OF BOTH HIPS, UNSPECIFIED OSTEOARTHRITIS TYPE: Primary | ICD-10-CM

## 2023-10-23 PROCEDURE — 97110 THERAPEUTIC EXERCISES: CPT | Performed by: PHYSICAL THERAPIST

## 2023-10-23 PROCEDURE — 97112 NEUROMUSCULAR REEDUCATION: CPT | Performed by: PHYSICAL THERAPIST

## 2023-10-23 PROCEDURE — 97530 THERAPEUTIC ACTIVITIES: CPT | Performed by: PHYSICAL THERAPIST

## 2023-10-23 NOTE — PROGRESS NOTES
Physical Therapy Daily Treatment Note  [address] [place] [address]      Patient: Nicki Amador  : 1970  Referring Practitioner: Akash Bahena MD  Date of Initial Visit: Type: THERAPY  Noted: 10/9/2023  Today's Date: 10/23/2023  Patient seen for: 4 sessions      Visit Diagnoses:     ICD-10-CM ICD-9-CM   1. Osteoarthritis of both hips, unspecified osteoarthritis type  M16.0 715.95   2. Bursitis of right hip, unspecified bursa  M70.71 726.5   3. Bilateral hip pain  M25.551 719.45    M25.552    4. Difficulty walking  R26.2 719.7       Subjective   Nicki Amador reports she has been very stressed, so her neck and shoulders are bothersome to her today. Otherwise her low back/hips are feeling fine.     Pain Level (0-10): 0    Objective     See Exercise, Manual, and Modality Logs for complete treatment.     Patient Education: cues for form with new ex, what to expect with post workout soreness. Due to neck and shoulder stiffness, discussion of gentle ROM (shld shrug, scap squeeze and cervical rotations) to alleviate.     Assessment & Plan       Assessment  Assessment details: Nicki presents to PT voicing that she is pain free of the ESPERANZA hips today, thus she is able to progress with further core engagement exs. She reports fatigue, but no pain. Continue to progress per tolerance, consider addition of planks and gait activities at next session.       Progress per Plan of Care and Progress strengthening /stabilization /functional activity          Timed:            Manual Therapy:         mins  29239;     Therapeutic Exercise:    15     mins  30718;     Neuromuscular Ricardo:   15     mins  03059;    Therapeutic Activity:     8     mins  77437;     Gait Training:           mins  48373;     Ultrasound:          mins  53055;    Ionto                                   mins   96143  Self Care                            mins   07215      Un-Timed:  Electrical Stimulation:         mins  61039  ( );  Traction          mins 28866    No Charge:   Cryopack:        mins  Hydrocollator MHP:         mins      Timed Treatment:   38   mins   Total Treatment:     38   mins      Charo Quiñones PTA  Physical Therapist Assistant  [place] License: [id number]

## 2023-10-25 ENCOUNTER — TREATMENT (OUTPATIENT)
Dept: PHYSICAL THERAPY | Facility: CLINIC | Age: 53
End: 2023-10-25
Payer: COMMERCIAL

## 2023-10-25 DIAGNOSIS — M25.552 BILATERAL HIP PAIN: ICD-10-CM

## 2023-10-25 DIAGNOSIS — M25.551 BILATERAL HIP PAIN: ICD-10-CM

## 2023-10-25 DIAGNOSIS — M16.0 OSTEOARTHRITIS OF BOTH HIPS, UNSPECIFIED OSTEOARTHRITIS TYPE: Primary | ICD-10-CM

## 2023-10-25 DIAGNOSIS — M70.71 BURSITIS OF RIGHT HIP, UNSPECIFIED BURSA: ICD-10-CM

## 2023-10-25 DIAGNOSIS — R26.2 DIFFICULTY WALKING: ICD-10-CM

## 2023-10-25 PROCEDURE — 97112 NEUROMUSCULAR REEDUCATION: CPT | Performed by: PHYSICAL THERAPIST

## 2023-10-25 PROCEDURE — 97110 THERAPEUTIC EXERCISES: CPT | Performed by: PHYSICAL THERAPIST

## 2023-10-25 NOTE — PROGRESS NOTES
Physical Therapy Daily Treatment Note  [address] [place] [address]      Patient: Nicki Amador  : 1970  Referring Practitioner: Akash Bahena MD  Date of Initial Visit: Type: THERAPY  Noted: 10/9/2023  Today's Date: 10/25/2023  Patient seen for: 5 sessions      Visit Diagnoses:     ICD-10-CM ICD-9-CM   1. Osteoarthritis of both hips, unspecified osteoarthritis type  M16.0 715.95   2. Bursitis of right hip, unspecified bursa  M70.71 726.5   3. Bilateral hip pain  M25.551 719.45    M25.552    4. Difficulty walking  R26.2 719.7         Subjective   Nicki Amador reports her neck is starting to loosen up, but is still somewhat tight from sleeping wrong. She reports that her hips have been doing well and she didn't have any increase in pain from last session.     Pain Level (0-10): 0    Objective     See Exercise, Manual, and Modality Logs for complete treatment.     Patient Education: cues for form with new ex, what to expect with post workout soreness. Focus on stretching tomorrow and resume gentle strengthening the following day.     Assessment & Plan       Assessment  Assessment details: Nicki presents to PT voicing that she is remains pain free of ESPERANZA Hips, thus further progressed core and hip strengthening. She reports abdominal soreness and general fatigue with new exs, but again no increase in pain. Progress away from mat at next session for gait activities and CKC LE strengthening.       Progress per Plan of Care and Progress strengthening /stabilization /functional activity          Timed:            Manual Therapy:         mins  29416;     Therapeutic Exercise:    15     mins  83676;     Neuromuscular Ricardo:   18     mins  47921;    Therapeutic Activity:     5     mins  68783;     Gait Training:           mins  21767;     Ultrasound:          mins  63126;    Ionto                                   mins   22328  Self Care                            mins    71043      Un-Timed:  Electrical Stimulation:         mins  79533 ( );  Traction          mins 68028    No Charge:   Cryopack:        mins  Hydrocollator MHP:         mins      Timed Treatment:   38   mins   Total Treatment:     38   mins      Charo Quiñones PTA  Physical Therapist Assistant  [place] License: [id number]

## 2023-10-30 ENCOUNTER — TREATMENT (OUTPATIENT)
Dept: PHYSICAL THERAPY | Facility: CLINIC | Age: 53
End: 2023-10-30
Payer: COMMERCIAL

## 2023-10-30 DIAGNOSIS — M25.551 BILATERAL HIP PAIN: ICD-10-CM

## 2023-10-30 DIAGNOSIS — M25.552 BILATERAL HIP PAIN: ICD-10-CM

## 2023-10-30 DIAGNOSIS — M70.71 BURSITIS OF RIGHT HIP, UNSPECIFIED BURSA: ICD-10-CM

## 2023-10-30 DIAGNOSIS — M16.0 OSTEOARTHRITIS OF BOTH HIPS, UNSPECIFIED OSTEOARTHRITIS TYPE: Primary | ICD-10-CM

## 2023-10-30 DIAGNOSIS — R26.2 DIFFICULTY WALKING: ICD-10-CM

## 2023-10-30 PROCEDURE — 97112 NEUROMUSCULAR REEDUCATION: CPT | Performed by: PHYSICAL THERAPIST

## 2023-10-30 PROCEDURE — 97110 THERAPEUTIC EXERCISES: CPT | Performed by: PHYSICAL THERAPIST

## 2023-10-30 NOTE — PROGRESS NOTES
Physical Therapy Daily Treatment Note  [address] [place] [address]    Patient: Nicki Amador  : 1970  Referring practitioner: Akash Bahena MD  Date of Initial Visit: Type: THERAPY  Noted: 10/9/2023  Today's Date: 10/30/2023  Patient seen for 6 sessions      Visit Diagnoses:    ICD-10-CM ICD-9-CM   1. Osteoarthritis of both hips, unspecified osteoarthritis type  M16.0 715.95   2. Bursitis of right hip, unspecified bursa  M70.71 726.5   3. Bilateral hip pain  M25.551 719.45    M25.552    4. Difficulty walking  R26.2 719.7       VISIT#: 6    Subjective   Nicki Amador reports that her hips are feeling better and her core is a lot stronger.  She did report increased neck/arm pain after addition of plank last visit.  Otherwise, she is pleased with her progress to date.    Pain Rating (0-10): 0    Objective     See Exercise, Manual, and Modality Logs for complete treatment.     Patient Education: Continue current HEP    Assessment & Plan       Assessment  Assessment details: Patient presented to physical therapy with significant reduction in bilateral hip pain.  She is demonstrating improved core activation and can perform higher level core activities while engaging appropriate muscles and avoiding valsalva.  Held side plank and added sidelying clam shells for HEP.        Progress per Plan of Care and Progress strengthening /stabilization /functional activity.  May decrease PT frequency to 1x/wk next week, in preparation for discharge to final HEP.          Timed:         Manual Therapy:         mins  39480;     Therapeutic Exercise:    20     mins  26262;     Neuromuscular Ricardo:    15    mins  29336;    Therapeutic Activity:     15     mins  67681;     Gait Training:           mins  18106;     Ultrasound:          mins  42912;    Ionto                                   mins   27171  Self Care                            mins   52705    Un-Timed:  Electrical Stimulation:          mins  27122 ( );  [facility]         mins 06866  Dry Needling          mins 22320/15271  Traction          mins 81809  Re-Eval                               mins  57949    No Charge:   Cryopack                         mins  Moist Heat                       mins    Timed Treatment:   50   mins   Total Treatment:     50   mins        Selina Bowen PT  Physical Therapist  [place] License: [id number]

## 2023-11-01 ENCOUNTER — TREATMENT (OUTPATIENT)
Dept: PHYSICAL THERAPY | Facility: CLINIC | Age: 53
End: 2023-11-01
Payer: COMMERCIAL

## 2023-11-01 DIAGNOSIS — M16.0 OSTEOARTHRITIS OF BOTH HIPS, UNSPECIFIED OSTEOARTHRITIS TYPE: Primary | ICD-10-CM

## 2023-11-01 DIAGNOSIS — M25.552 BILATERAL HIP PAIN: ICD-10-CM

## 2023-11-01 DIAGNOSIS — R26.2 DIFFICULTY WALKING: ICD-10-CM

## 2023-11-01 DIAGNOSIS — M25.551 BILATERAL HIP PAIN: ICD-10-CM

## 2023-11-01 DIAGNOSIS — M70.71 BURSITIS OF RIGHT HIP, UNSPECIFIED BURSA: ICD-10-CM

## 2023-11-01 PROCEDURE — 97112 NEUROMUSCULAR REEDUCATION: CPT | Performed by: PHYSICAL THERAPIST

## 2023-11-01 PROCEDURE — 97110 THERAPEUTIC EXERCISES: CPT | Performed by: PHYSICAL THERAPIST

## 2023-11-01 NOTE — PROGRESS NOTES
Physical Therapy Daily Treatment Note  [address] [place] [address]      Patient: Nicki Amador  : 1970  Referring Practitioner: Akash Bahena MD  Date of Initial Visit: Type: THERAPY  Noted: 10/9/2023  Today's Date: 2023  Patient seen for: 7 sessions      Visit Diagnoses:     ICD-10-CM ICD-9-CM   1. Osteoarthritis of both hips, unspecified osteoarthritis type  M16.0 715.95   2. Bursitis of right hip, unspecified bursa  M70.71 726.5   3. Bilateral hip pain  M25.551 719.45    M25.552    4. Difficulty walking  R26.2 719.7         Subjective   Nicki Amador reports she was able to marylou after her granddaughter while trick or treating last night and surprisingly did not experience much pain. She reports minimal discomfort at the start of today's session, but voices 0 pain at the end of the session.       Pain Level (0-10): 2 at start; 0 at end    Objective     See Exercise, Manual, and Modality Logs for complete treatment.     Patient Education: cues for form.     Assessment & Plan       Assessment  Assessment details: Nicki is able to progress with lateral walking and balance tasks without any significant issue. Reports that she feels appropriately challenged. Also progressed SL Clam to include resistance and core engagement, again with reports of feeling challenged but surprisingly no increase in pain. Ended with gentle stretching, continue to progress with goal of d/c to HEP.       Progress per Plan of Care and Progress strengthening /stabilization /functional activity          Timed:            Manual Therapy:         mins  87357;     Therapeutic Exercise:    15     mins  67193;     Neuromuscular Ricardo:   23     mins  55373;    Therapeutic Activity:          mins  26174;     Gait Training:           mins  12226;     Ultrasound:          mins  73743;    Ionto                                   mins   32183  Self Care                            mins    70814      Un-Timed:  Electrical Stimulation:         mins  00147 ( );  Traction          mins 84867    No Charge:   Cryopack:        mins  Hydrocollator MHP:         mins      Timed Treatment:   38   mins   Total Treatment:     38   mins      Charo Quiñones PTA  Physical Therapist Assistant  [place] License: [id number]

## 2023-11-06 ENCOUNTER — TREATMENT (OUTPATIENT)
Dept: PHYSICAL THERAPY | Facility: CLINIC | Age: 53
End: 2023-11-06
Payer: COMMERCIAL

## 2023-11-06 DIAGNOSIS — M16.0 OSTEOARTHRITIS OF BOTH HIPS, UNSPECIFIED OSTEOARTHRITIS TYPE: Primary | ICD-10-CM

## 2023-11-06 DIAGNOSIS — M70.71 BURSITIS OF RIGHT HIP, UNSPECIFIED BURSA: ICD-10-CM

## 2023-11-06 DIAGNOSIS — R26.2 DIFFICULTY WALKING: ICD-10-CM

## 2023-11-06 DIAGNOSIS — M25.552 BILATERAL HIP PAIN: ICD-10-CM

## 2023-11-06 DIAGNOSIS — M25.551 BILATERAL HIP PAIN: ICD-10-CM

## 2023-11-06 PROCEDURE — 97112 NEUROMUSCULAR REEDUCATION: CPT | Performed by: PHYSICAL THERAPIST

## 2023-11-06 PROCEDURE — 97110 THERAPEUTIC EXERCISES: CPT | Performed by: PHYSICAL THERAPIST

## 2023-11-06 NOTE — PROGRESS NOTES
Physical Therapy Daily Treatment Note  [address] [address]    Patient: Nicki Amador  : 1970  Referring practitioner: Akash Bahena MD  Date of Initial Visit: Type: THERAPY  Noted: 10/9/2023  Today's Date: 2023  Patient seen for 8 sessions      Visit Diagnoses:    ICD-10-CM ICD-9-CM   1. Osteoarthritis of both hips, unspecified osteoarthritis type  M16.0 715.95   2. Bursitis of right hip, unspecified bursa  M70.71 726.5   3. Bilateral hip pain  M25.551 719.45    M25.552    4. Difficulty walking  R26.2 719.7       VISIT#: 8    Subjective   Nicki Amador reports that she was afraid she overdid it this weekend.  On Saturday, she lifting a 40# bag of litter and felt a strain.  She struggled to get it back out of her cart and into her vehicle.  She states that she felt a grabbing sensation in her back and hips.  She applied ice, rested and took a ice and muscle relaxer.  The next day her pain had resolved.  She is still doing well today.  Pain Rating (0-10): 0    Objective     See Exercise, Manual, and Modality Logs for complete treatment.     Patient Education: Discussed lifting mechanics and caution with heavy loads    Assessment & Plan       Assessment  Assessment details: Patient presented with reduction in overall pain today but did have difficulty this weekend with completing her grocery shopping and lifting.  She reported a flare in her pain but was able to mange her symptoms with rest, ice, stretching, and muscle relaxers.  She is independent with her current HEP but could benefit from further progression to include education in proper/safe lifting techniques and body mechanics.       Progress per Plan of Care and Progress strengthening /stabilization /functional activity          Timed:         Manual Therapy:         mins  25727;     Therapeutic Exercise:    15     mins  45227;     Neuromuscular Ricardo:    23    mins  43338;    Therapeutic Activity:           mins  25202;     Gait Training:           mins  79861;     Ultrasound:          mins  51441;    Ionto                                   mins   81306  Self Care                            mins   90873    Un-Timed:  Electrical Stimulation:         mins  07665 ( );  Canalith Repos         mins 14912  Dry Needling          mins 45205/80141  Traction          mins 49780  Re-Eval                               mins  53087    No Charge:   Cryopack                         mins  Moist Heat                       mins    Timed Treatment:   38   mins   Total Treatment:     38   mins        Selina Bowen PT  Physical Therapist  [place] License: [id number]

## 2023-11-13 ENCOUNTER — TREATMENT (OUTPATIENT)
Dept: PHYSICAL THERAPY | Facility: CLINIC | Age: 53
End: 2023-11-13
Payer: COMMERCIAL

## 2023-11-13 DIAGNOSIS — R26.2 DIFFICULTY WALKING: ICD-10-CM

## 2023-11-13 DIAGNOSIS — M25.551 BILATERAL HIP PAIN: ICD-10-CM

## 2023-11-13 DIAGNOSIS — M70.71 BURSITIS OF RIGHT HIP, UNSPECIFIED BURSA: ICD-10-CM

## 2023-11-13 DIAGNOSIS — M25.552 BILATERAL HIP PAIN: ICD-10-CM

## 2023-11-13 DIAGNOSIS — M16.0 OSTEOARTHRITIS OF BOTH HIPS, UNSPECIFIED OSTEOARTHRITIS TYPE: Primary | ICD-10-CM

## 2023-11-13 PROCEDURE — 97112 NEUROMUSCULAR REEDUCATION: CPT | Performed by: PHYSICAL THERAPIST

## 2023-11-13 PROCEDURE — 97110 THERAPEUTIC EXERCISES: CPT | Performed by: PHYSICAL THERAPIST

## 2023-11-13 NOTE — PROGRESS NOTES
Physical Therapy Daily Treatment Note/Progress Note  [address] [place] [address]    Patient: Nicki Amador  : 1970  Referring practitioner: Akash Bahena MD  Date of Initial Visit: Type: THERAPY  Noted: 10/9/2023  Today's Date: 2023  Patient seen for 9 sessions      Visit Diagnoses:    ICD-10-CM ICD-9-CM   1. Osteoarthritis of both hips, unspecified osteoarthritis type  M16.0 715.95   2. Bursitis of right hip, unspecified bursa  M70.71 726.5   3. Bilateral hip pain  M25.551 719.45    M25.552    4. Difficulty walking  R26.2 719.7       VISIT#: 9    Subjective   Nicki Amador reports that she has had a stressful week with [facility].   She states that it has been hard to work on her exercises and had to reschedule her MD follow-up.  She states that she feels like PT is helpful but she recognizes that lifting 20# is a significant challenge for her.   Pain Rating (0-10): 0/10 currently, 4-5/10 at it's worst this past week  Lower Extremity Functional Scale: 45/80 points (previously 37/80 points)    Objective          Postural Observations  Seated posture: fair  Standing posture: fair      Palpation   Left   Hypertonic in the lumbar paraspinals.     Right   Hypertonic in the lumbar paraspinals. Tenderness of the gluteus medius and piriformis.     Active Range of Motion     Lumbar   Flexion: 75 degrees   Extension: 10 degrees   Left lateral flexion: 25 degrees   Right lateral flexion: 25 degrees   Left Hip   Flexion: 115 degrees     Right Hip   Flexion: 115 degrees     Passive Range of Motion   Left Hip   External rotation (90/90): 55 degrees   Internal rotation (90/90): 25 degrees     Right Hip   External rotation (90/90): 50 degrees   Internal rotation (90/90): 25 degrees     Strength/Myotome Testing     Left Hip   Planes of Motion   Flexion: 4  Extension: 4  Abduction: 4+  Adduction: 4+    Right Hip   Planes of Motion   Flexion: 4  Extension:  4  Abduction: 4+  Adduction: 4+    Left Knee   Flexion: 4+  Extension: 4+    Right Knee   Flexion: 4+  Extension: 4+    Left Ankle/Foot   Dorsiflexion: 4+  Plantar flexion: 4-    Right Ankle/Foot   Dorsiflexion: 4+  Plantar flexion: 4-    Ambulation     Observational Gait   Gait: within functional limits         See Exercise, Manual, and Modality Logs for complete treatment.     Patient Education: Continue current HEP    Assessment & Plan       Assessment  Impairments: abnormal muscle firing, abnormal muscle tone, abnormal or restricted ROM, activity intolerance, impaired balance, impaired physical strength, lacks appropriate home exercise program, pain with function and weight-bearing intolerance   Functional limitations: carrying objects, lifting, sleeping, walking, uncomfortable because of pain, sitting, standing, stooping and unable to perform repetitive tasks   Assessment details: The patient is a 52 y.o. female who presented to physical therapy for bilateral hip pain that is primarily on her right side. She has been seen for a total of 9 visits to date. Upon today's reassessment, the patient demonstrates the continued impairments: abnormal muscle firing, increased muscle tone, decreased ROM, decreased tolerance to prolonged activities/weightbearing, impaired balance, core/hip weakness, and she continues with pain with function. Due to these impairments, the patient is challenged to lift 20#, carry objects, push or pull, sit/stand for prolonged periods of time, obtain quality sleep, walk long distances, and is uncomfortable because of the pain. The patient could benefit from continued skilled PT services to address functional limitations and impairments and to improve patient quality of life.     Barriers to therapy: potential limitation in insurance covered visits    Goals  Plan Goals: STG's: 3 weeks  Patient will report a decrease in maximum pain by 25% - MET  Patient will be able to pick a light (<5#) object  off the floor without an increase in pain- PARTIALLY MET  Patient will be able to tolerate standing for >/= 15 minutes without increased pain- MET  Patient will be able to perform HEP with minimal verbal cues-MET    LTG's: By discharge  Patient will report a decrease in maximum pain by 50%- PARTIALLY MET  Patient will demonstrate an improvement in overall function as measured by an improvement in the LEFS score by >/= 10 points - PARTIALLY MET,45/80 points (previously 37/80 points)  Patient will be able to pick a mildly heavy (approximately 15#) object off the floor without an increase in pain in order to be able to lift a filled laundry basket or cleaning supplies- NOT MET  Patient will be able to stoop for >/= 20 minutes at a time without increased pain in order to clean around the house- NOT MET  Patient will be able to tolerate standing for >/= 45 minutes without increased pain in order to be able to stand at work or do tasks around the home- PARTIALLY MET  Patient will be able to tolerate sitting for >/= 90 minutes without increased pain in order to be able to sit for longer at work- PARTIALLY MET  Patient will be able to hike on a relatively mildly difficult trail for >/= 30 minutes without increased pain- NOT MET  Patient will be able to be active with her granddaughter for >/= 30 minutes at a time without increased pain- PARTIALLY MET  Patient will be independent with final HEP - PARTIALLY MET    Plan  Therapy options: will be seen for skilled therapy services  Planned modality interventions: cryotherapy, dry needling, electrical stimulation/Russian stimulation, TENS, thermotherapy (hydrocollator packs), hydrotherapy and ultrasound  Planned therapy interventions: balance/weight-bearing training, body mechanics training, flexibility, functional ROM exercises, home exercise program, IADL retraining, gait training, manual therapy, neuromuscular re-education, postural training, soft tissue mobilization,  strengthening, stretching, therapeutic activities, ADL retraining, motor coordination training and abdominal trunk stabilization  Frequency: 2x week  Duration in weeks: 8  Treatment plan discussed with: patient      Progress per Plan of Care and Progress strengthening /stabilization /functional activity          Timed:         Manual Therapy:         mins  58133;     Therapeutic Exercise:    15     mins  25838;     Neuromuscular Ricardo:    23    mins  36364;    Therapeutic Activity:          mins  41447;     Gait Training:           mins  57004;     Ultrasound:          mins  26683;    Ionto                                   mins   09308  Self Care                            mins   32785    Un-Timed:  Electrical Stimulation:         mins  40515 ( );  Canalith Repos         mins 38940  Dry Needling          mins 07454/14274  Traction          mins 15817  Re-Eval                               mins  80092    No Charge:   Cryopack                         mins  Moist Heat                       mins    Timed Treatment:   38   mins   Total Treatment:     38   mins        Selina Bowen PT  Physical Therapist  [place] License: [id number]

## 2023-11-29 ENCOUNTER — TELEPHONE (OUTPATIENT)
Dept: PHYSICAL THERAPY | Facility: CLINIC | Age: 53
End: 2023-11-29
Payer: COMMERCIAL

## 2023-12-18 ENCOUNTER — DOCUMENTATION (OUTPATIENT)
Dept: PHYSICAL THERAPY | Facility: CLINIC | Age: 53
End: 2023-12-18
Payer: COMMERCIAL

## 2023-12-18 NOTE — PROGRESS NOTES
Physical Therapy Discharge Summary  [facility]    [address] [place] [address]      Patient Information  Patient: Nicki Amador  : 1970    Dates of Physical Therapy visits: 10/9/23-23  Number of Visits: 9     Discharge Status of Patient: See MD Note dated 23    Goals: Partially Met    Discharge Plan: Continue with current home exercise program as instructed  Patient to return to referring/providing physician    Comments Patient did not return for ongoing care so their chart will be discharged at this time.    Date of Discharge: 2023        Selina Bowen, PT  Physical Therapist  [place] License: [id number]

## 2024-03-15 ENCOUNTER — OFFICE (AMBULATORY)
Dept: URBAN - METROPOLITAN AREA PATHOLOGY 19 | Facility: PATHOLOGY | Age: 54
End: 2024-03-15
Payer: COMMERCIAL

## 2024-03-15 ENCOUNTER — ON CAMPUS - OUTPATIENT (AMBULATORY)
Dept: URBAN - METROPOLITAN AREA HOSPITAL 2 | Facility: HOSPITAL | Age: 54
End: 2024-03-15
Payer: COMMERCIAL

## 2024-03-15 VITALS
HEIGHT: 64 IN | SYSTOLIC BLOOD PRESSURE: 111 MMHG | RESPIRATION RATE: 16 BRPM | OXYGEN SATURATION: 99 % | SYSTOLIC BLOOD PRESSURE: 106 MMHG | WEIGHT: 154 LBS | OXYGEN SATURATION: 98 % | SYSTOLIC BLOOD PRESSURE: 107 MMHG | SYSTOLIC BLOOD PRESSURE: 100 MMHG | DIASTOLIC BLOOD PRESSURE: 67 MMHG | SYSTOLIC BLOOD PRESSURE: 118 MMHG | DIASTOLIC BLOOD PRESSURE: 74 MMHG | SYSTOLIC BLOOD PRESSURE: 108 MMHG | SYSTOLIC BLOOD PRESSURE: 120 MMHG | DIASTOLIC BLOOD PRESSURE: 70 MMHG | TEMPERATURE: 97.8 F | HEART RATE: 69 BPM | DIASTOLIC BLOOD PRESSURE: 71 MMHG | SYSTOLIC BLOOD PRESSURE: 121 MMHG | SYSTOLIC BLOOD PRESSURE: 125 MMHG | DIASTOLIC BLOOD PRESSURE: 51 MMHG | DIASTOLIC BLOOD PRESSURE: 69 MMHG | HEART RATE: 72 BPM | HEART RATE: 66 BPM | HEART RATE: 68 BPM | DIASTOLIC BLOOD PRESSURE: 77 MMHG | HEART RATE: 75 BPM | DIASTOLIC BLOOD PRESSURE: 58 MMHG | OXYGEN SATURATION: 100 % | RESPIRATION RATE: 18 BRPM | HEART RATE: 67 BPM

## 2024-03-15 DIAGNOSIS — Z86.010 PERSONAL HISTORY OF COLONIC POLYPS: ICD-10-CM

## 2024-03-15 DIAGNOSIS — D12.5 BENIGN NEOPLASM OF SIGMOID COLON: ICD-10-CM

## 2024-03-15 DIAGNOSIS — Z09 ENCOUNTER FOR FOLLOW-UP EXAMINATION AFTER COMPLETED TREATMEN: ICD-10-CM

## 2024-03-15 DIAGNOSIS — K63.5 POLYP OF COLON: ICD-10-CM

## 2024-03-15 DIAGNOSIS — K57.30 DIVERTICULOSIS OF LARGE INTESTINE WITHOUT PERFORATION OR ABS: ICD-10-CM

## 2024-03-15 LAB
GI HISTOLOGY: A. CECUM: (no result)
GI HISTOLOGY: B. SIGMOID COLON: (no result)
GI HISTOLOGY: PDF REPORT: (no result)

## 2024-03-15 PROCEDURE — 45385 COLONOSCOPY W/LESION REMOVAL: CPT | Mod: 33 | Performed by: INTERNAL MEDICINE

## 2024-03-15 PROCEDURE — 88305 TISSUE EXAM BY PATHOLOGIST: CPT | Performed by: PATHOLOGY

## 2024-07-05 ENCOUNTER — LAB (OUTPATIENT)
Dept: FAMILY MEDICINE CLINIC | Facility: CLINIC | Age: 54
End: 2024-07-05
Payer: COMMERCIAL

## 2024-07-05 ENCOUNTER — OFFICE VISIT (OUTPATIENT)
Dept: FAMILY MEDICINE CLINIC | Facility: CLINIC | Age: 54
End: 2024-07-05
Payer: COMMERCIAL

## 2024-07-05 VITALS
SYSTOLIC BLOOD PRESSURE: 126 MMHG | WEIGHT: 153 LBS | HEIGHT: 63 IN | HEART RATE: 83 BPM | OXYGEN SATURATION: 99 % | DIASTOLIC BLOOD PRESSURE: 77 MMHG | BODY MASS INDEX: 27.11 KG/M2 | TEMPERATURE: 97.5 F

## 2024-07-05 DIAGNOSIS — B35.1 ONYCHOMYCOSIS: ICD-10-CM

## 2024-07-05 DIAGNOSIS — Z12.31 ENCOUNTER FOR SCREENING MAMMOGRAM FOR MALIGNANT NEOPLASM OF BREAST: ICD-10-CM

## 2024-07-05 DIAGNOSIS — Z80.3 FAMILY HISTORY OF BREAST CANCER: ICD-10-CM

## 2024-07-05 DIAGNOSIS — N60.11 FIBROCYSTIC DISEASE OF RIGHT BREAST: ICD-10-CM

## 2024-07-05 DIAGNOSIS — E78.5 HYPERLIPIDEMIA, UNSPECIFIED HYPERLIPIDEMIA TYPE: ICD-10-CM

## 2024-07-05 DIAGNOSIS — Z12.4 CERVICAL CANCER SCREENING: ICD-10-CM

## 2024-07-05 DIAGNOSIS — Z00.00 PREVENTATIVE HEALTH CARE: Primary | ICD-10-CM

## 2024-07-05 LAB
25(OH)D3 SERPL-MCNC: 22.7 NG/ML (ref 30–100)
ALBUMIN SERPL-MCNC: 4.3 G/DL (ref 3.5–5.2)
ALBUMIN/GLOB SERPL: 1.6 G/DL
ALP SERPL-CCNC: 69 U/L (ref 39–117)
ALT SERPL W P-5'-P-CCNC: 30 U/L (ref 1–33)
ANION GAP SERPL CALCULATED.3IONS-SCNC: 11.8 MMOL/L (ref 5–15)
AST SERPL-CCNC: 22 U/L (ref 1–32)
BASOPHILS # BLD AUTO: 0.05 10*3/MM3 (ref 0–0.2)
BASOPHILS NFR BLD AUTO: 0.6 % (ref 0–1.5)
BILIRUB SERPL-MCNC: 0.5 MG/DL (ref 0–1.2)
BUN SERPL-MCNC: 9 MG/DL (ref 6–20)
BUN/CREAT SERPL: 12.9 (ref 7–25)
CALCIUM SPEC-SCNC: 9.1 MG/DL (ref 8.6–10.5)
CHLORIDE SERPL-SCNC: 104 MMOL/L (ref 98–107)
CHOLEST SERPL-MCNC: 172 MG/DL (ref 0–200)
CO2 SERPL-SCNC: 26.2 MMOL/L (ref 22–29)
CREAT SERPL-MCNC: 0.7 MG/DL (ref 0.57–1)
DEPRECATED RDW RBC AUTO: 43.4 FL (ref 37–54)
EGFRCR SERPLBLD CKD-EPI 2021: 102.9 ML/MIN/1.73
EOSINOPHIL # BLD AUTO: 0.16 10*3/MM3 (ref 0–0.4)
EOSINOPHIL NFR BLD AUTO: 2 % (ref 0.3–6.2)
ERYTHROCYTE [DISTWIDTH] IN BLOOD BY AUTOMATED COUNT: 12.6 % (ref 12.3–15.4)
GLOBULIN UR ELPH-MCNC: 2.7 GM/DL
GLUCOSE SERPL-MCNC: 93 MG/DL (ref 65–99)
HBA1C MFR BLD: 5.7 % (ref 4.8–5.6)
HCT VFR BLD AUTO: 40.9 % (ref 34–46.6)
HCV AB SER QL: NORMAL
HDLC SERPL-MCNC: 53 MG/DL (ref 40–60)
HGB BLD-MCNC: 13.7 G/DL (ref 12–15.9)
IMM GRANULOCYTES # BLD AUTO: 0.04 10*3/MM3 (ref 0–0.05)
IMM GRANULOCYTES NFR BLD AUTO: 0.5 % (ref 0–0.5)
LDLC SERPL CALC-MCNC: 104 MG/DL (ref 0–100)
LDLC/HDLC SERPL: 1.94 {RATIO}
LYMPHOCYTES # BLD AUTO: 3.33 10*3/MM3 (ref 0.7–3.1)
LYMPHOCYTES NFR BLD AUTO: 41.7 % (ref 19.6–45.3)
MCH RBC QN AUTO: 32.2 PG (ref 26.6–33)
MCHC RBC AUTO-ENTMCNC: 33.5 G/DL (ref 31.5–35.7)
MCV RBC AUTO: 96 FL (ref 79–97)
MONOCYTES # BLD AUTO: 0.47 10*3/MM3 (ref 0.1–0.9)
MONOCYTES NFR BLD AUTO: 5.9 % (ref 5–12)
NEUTROPHILS NFR BLD AUTO: 3.94 10*3/MM3 (ref 1.7–7)
NEUTROPHILS NFR BLD AUTO: 49.3 % (ref 42.7–76)
NRBC BLD AUTO-RTO: 0 /100 WBC (ref 0–0.2)
PLATELET # BLD AUTO: 281 10*3/MM3 (ref 140–450)
PMV BLD AUTO: 10.2 FL (ref 6–12)
POTASSIUM SERPL-SCNC: 4.4 MMOL/L (ref 3.5–5.2)
PROT SERPL-MCNC: 7 G/DL (ref 6–8.5)
RBC # BLD AUTO: 4.26 10*6/MM3 (ref 3.77–5.28)
SODIUM SERPL-SCNC: 142 MMOL/L (ref 136–145)
T4 FREE SERPL-MCNC: 1.15 NG/DL (ref 0.92–1.68)
TRIGL SERPL-MCNC: 81 MG/DL (ref 0–150)
TSH SERPL DL<=0.05 MIU/L-ACNC: 1.49 UIU/ML (ref 0.27–4.2)
VLDLC SERPL-MCNC: 15 MG/DL (ref 5–40)
WBC NRBC COR # BLD AUTO: 7.99 10*3/MM3 (ref 3.4–10.8)

## 2024-07-05 PROCEDURE — 84443 ASSAY THYROID STIM HORMONE: CPT | Performed by: NURSE PRACTITIONER

## 2024-07-05 PROCEDURE — 36415 COLL VENOUS BLD VENIPUNCTURE: CPT | Performed by: NURSE PRACTITIONER

## 2024-07-05 PROCEDURE — 80053 COMPREHEN METABOLIC PANEL: CPT | Performed by: NURSE PRACTITIONER

## 2024-07-05 PROCEDURE — 85025 COMPLETE CBC W/AUTO DIFF WBC: CPT | Performed by: NURSE PRACTITIONER

## 2024-07-05 PROCEDURE — 80061 LIPID PANEL: CPT | Performed by: NURSE PRACTITIONER

## 2024-07-05 PROCEDURE — 82306 VITAMIN D 25 HYDROXY: CPT | Performed by: NURSE PRACTITIONER

## 2024-07-05 PROCEDURE — 84439 ASSAY OF FREE THYROXINE: CPT | Performed by: NURSE PRACTITIONER

## 2024-07-05 PROCEDURE — 83036 HEMOGLOBIN GLYCOSYLATED A1C: CPT | Performed by: NURSE PRACTITIONER

## 2024-07-05 PROCEDURE — 86803 HEPATITIS C AB TEST: CPT | Performed by: NURSE PRACTITIONER

## 2024-07-05 RX ORDER — MAGNESIUM GLUCONATE 27 MG(500)
500 TABLET ORAL DAILY
COMMUNITY

## 2024-07-05 RX ORDER — CICLOPIROX 80 MG/ML
SOLUTION TOPICAL NIGHTLY
Qty: 6 ML | Refills: 0 | Status: SHIPPED | OUTPATIENT
Start: 2024-07-05

## 2024-07-05 RX ORDER — ALBUTEROL SULFATE 90 UG/1
1-2 AEROSOL, METERED RESPIRATORY (INHALATION)
COMMUNITY
Start: 2024-06-27 | End: 2024-07-27

## 2024-07-05 RX ORDER — ATORVASTATIN CALCIUM 20 MG/1
20 TABLET, FILM COATED ORAL
COMMUNITY
End: 2024-07-05 | Stop reason: SDUPTHER

## 2024-07-05 RX ORDER — ATORVASTATIN CALCIUM 20 MG/1
20 TABLET, FILM COATED ORAL
Qty: 90 TABLET | Status: CANCELLED | OUTPATIENT
Start: 2024-07-05

## 2024-07-05 RX ORDER — PREDNISONE 20 MG/1
2 TABLET ORAL DAILY
COMMUNITY
Start: 2024-06-27

## 2024-07-05 RX ORDER — MELATONIN
1000 2 TIMES DAILY
COMMUNITY

## 2024-07-05 RX ORDER — CETIRIZINE HYDROCHLORIDE 10 MG/1
10 TABLET ORAL DAILY
COMMUNITY

## 2024-07-05 RX ORDER — DOXYCYCLINE HYCLATE 100 MG
TABLET ORAL
COMMUNITY
Start: 2024-06-27

## 2024-07-05 RX ORDER — ATORVASTATIN CALCIUM 20 MG/1
20 TABLET, FILM COATED ORAL
Qty: 90 TABLET | Refills: 1 | Status: SHIPPED | OUTPATIENT
Start: 2024-07-05

## 2024-07-05 NOTE — PROGRESS NOTES
Chief Complaint  Establish Care, Annual Exam, Med Refill, Allergies, and referral (Obgyn )    Subjective        Nicki Amador is here to establish care  History of Present Illness  Nicki is a 53-year-old female presenting today to establish care. She has a couple concerns she would like to discuss today. She recently was diagnosed with bronchitis. She reports oxygen saturation dipping in the low 90s at night, but normalizes as she is up moving.  She was put on a steroid pack, but did not complete it due to the insomnia it caused.  She is currently using an albuterol inhaler to manage her symptoms.  She is also concerned about the toenails on her left foot.  They are abnormally thick and yellow.  She has been using over-the-counter medications without improvement.  She has a family history of multiple cancers.  Her mother passed away in her 60s from breast cancer.  The patient has a history of fibrocystic breast and at one point she had to have a cyst drained.      Previous PCP: Dr. Aj in [place], [place]  Specialist: ortho: Dr. Dogu Bahena  Marital Status: single  Children: 2  Occupation: customer service at [employer]  Exercise: occasionally  Diet: has started low sodium diet. Drinks a lot of caffeine.   Tobacco use: 1 ppd. Was down to 0.5ppd, but life is stressful right now  Alcohol use: denies  Recreational drug use: denies    Routine Health Maintenance:  Dental: wears dentures  Vision: UTD  Colonoscopy: 2024. Removed benign polyps. Repeat in 3 years  Pap Smear: overdue. Will do today  Mammogram: due. Will order today      Vaccines:  Shingrix: denies  Influenza: denies  COVID: UTD  Tdap: denies      The following portions of the patient's history were reviewed and updated as appropriate: allergies, current medications, past family history, past medical history, past social history, past surgical history and problem list.    Allergies   Allergen Reactions    Penicillins Itching and Rash         Current  Outpatient Medications:     albuterol sulfate  (90 Base) MCG/ACT inhaler, Inhale 1-2 puffs., Disp: , Rfl:     atorvastatin (LIPITOR) 20 MG tablet, Take 1 tablet by mouth every night at bedtime., Disp: 90 tablet, Rfl: 1    cetirizine (zyrTEC) 10 MG tablet, Take 1 tablet by mouth Daily., Disp: , Rfl:     Cholecalciferol 25 MCG (1000 UT) tablet, Take 1 tablet by mouth 2 (Two) Times a Day., Disp: , Rfl:     doxycycline (VIBRAMYICN) 100 MG tablet, TAKE 1 TABLET (100 MG TOTAL) BY MOUTH TWICE A DAY FOR 5 DAYS NOT FOR PREGNANCY OR LACTATION, Disp: , Rfl:     ibuprofen (ADVIL,MOTRIN) 200 MG tablet, Take 1 tablet by mouth Every 6 (Six) Hours As Needed for Mild Pain., Disp: , Rfl:     magnesium gluconate (MAGONATE) 500 MG tablet, Take 1 tablet by mouth Daily., Disp: , Rfl:     predniSONE (DELTASONE) 20 MG tablet, Take 2 tablets by mouth Daily., Disp: , Rfl:     ciclopirox (PENLAC) 8 % solution, Apply  topically to the appropriate area as directed Every Night., Disp: 6 mL, Rfl: 0    Family History   Problem Relation Age of Onset    Heart attack Mother     Breast cancer Mother     Brain cancer Mother     Lung cancer Mother     Heart attack Father     Lung cancer Brother         bladder cancer    Bone cancer Maternal Aunt     Lung cancer Paternal Uncle     Skin cancer Paternal Uncle         Past Medical History:   Diagnosis Date    Allergic     Arthritis     Headache     Hyperlipidemia     Hypertension     Injury of neck     Shortness of breath 08/24/2020       Social History     Socioeconomic History    Marital status:    Tobacco Use    Smoking status: Every Day     Current packs/day: 0.50     Average packs/day: 0.5 packs/day for 30.0 years (15.0 ttl pk-yrs)     Types: Cigarettes     Passive exposure: Current    Smokeless tobacco: Never   Vaping Use    Vaping status: Never Used   Substance and Sexual Activity    Alcohol use: Not Currently     Comment: socially     Drug use: Not Currently     Types: Marijuana      "Comment: THC gummies        Past Surgical History:   Procedure Laterality Date    INDUCED       TONSILLECTOMY      UTERINE SUSPENSION LAPAROSCOPIC          Patient Active Problem List   Diagnosis    Chest pain, atypical    Dizziness    Tobacco use    Other fatigue       Immunization History   Administered Date(s) Administered    COVID-19 (MODERNA) Monovalent Original Booster 09/15/2021, 10/13/2021         Objective   Vital Signs:  /77 (BP Location: Right arm, Patient Position: Sitting, Cuff Size: Large Adult)   Pulse 83   Temp 97.5 °F (36.4 °C) (Temporal)   Ht 160 cm (63\")   Wt 69.4 kg (153 lb)   SpO2 99%   BMI 27.10 kg/m²   Estimated body mass index is 27.1 kg/m² as calculated from the following:    Height as of this encounter: 160 cm (63\").    Weight as of this encounter: 69.4 kg (153 lb).             Review of Systems   Constitutional:  Negative for activity change, appetite change, chills, fatigue, fever and unexpected weight change.   HENT:  Negative for congestion, rhinorrhea, sneezing and sore throat.    Eyes:  Negative for visual disturbance.   Respiratory:  Negative for cough, shortness of breath and wheezing.    Cardiovascular:  Negative for chest pain.   Gastrointestinal:  Negative for abdominal pain, constipation, diarrhea, nausea and vomiting.   Genitourinary:  Negative for difficulty urinating and dysuria.   Musculoskeletal:  Negative for arthralgias, gait problem and myalgias.   Skin:  Negative for color change, rash and wound.   Neurological:  Negative for dizziness, weakness, light-headedness, numbness and headaches.   Psychiatric/Behavioral:  Negative for self-injury, sleep disturbance and suicidal ideas. The patient is not nervous/anxious.      Physical Exam  Constitutional:       Appearance: Normal appearance.   HENT:      Head: Normocephalic and atraumatic.      Right Ear: Tympanic membrane, ear canal and external ear normal.      Left Ear: Tympanic membrane, ear canal and " external ear normal.      Nose: Nose normal.      Mouth/Throat:      Mouth: Mucous membranes are moist.      Pharynx: Oropharynx is clear.   Eyes:      Extraocular Movements: Extraocular movements intact.      Conjunctiva/sclera: Conjunctivae normal.      Pupils: Pupils are equal, round, and reactive to light.   Cardiovascular:      Rate and Rhythm: Normal rate and regular rhythm.      Pulses: Normal pulses.      Heart sounds: Normal heart sounds.   Pulmonary:      Effort: Pulmonary effort is normal.      Breath sounds: Normal breath sounds.   Abdominal:      General: Abdomen is flat. Bowel sounds are normal.      Palpations: Abdomen is soft.   Musculoskeletal:         General: Normal range of motion.      Cervical back: Normal range of motion and neck supple.   Skin:     General: Skin is warm and dry.      Capillary Refill: Capillary refill takes less than 2 seconds.   Neurological:      Mental Status: She is alert and oriented to person, place, and time.   Psychiatric:         Mood and Affect: Mood normal.         Behavior: Behavior normal.         Thought Content: Thought content normal.         Judgment: Judgment normal.        Result Review :                   Assessment and Plan   Diagnoses and all orders for this visit:    1. Preventative health care (Primary)  Comments:  Overall healthy 53-year-old female.  Labs ordered today.  Follow-up in 6 months  Orders:  -     Comprehensive Metabolic Panel  -     Hemoglobin A1c  -     Lipid Panel  -     T4, Free  -     TSH  -     Vitamin D,25-Hydroxy  -     CBC & Differential  -     Hepatitis C Antibody    2. Encounter for screening mammogram for malignant neoplasm of breast  -     Mammo Screening Digital Tomosynthesis Bilateral With CAD; Future    3. Cervical cancer screening  -     IGP,Aptima HPV,Age Gdln    4. Family history of breast cancer  -     Ambulatory Referral to Hematology / Oncology    5. Fibrocystic disease of right breast  -     Ambulatory Referral to  Hematology / Oncology    6. Onychomycosis  -     ciclopirox (PENLAC) 8 % solution; Apply  topically to the appropriate area as directed Every Night.  Dispense: 6 mL; Refill: 0    7. Hyperlipidemia, unspecified hyperlipidemia type  -     atorvastatin (LIPITOR) 20 MG tablet; Take 1 tablet by mouth every night at bedtime.  Dispense: 90 tablet; Refill: 1             Follow Up   Return in about 6 months (around 1/5/2025).  Patient was given instructions and counseling regarding her condition or for health maintenance advice. Please see specific information pulled into the AVS if appropriate.

## 2024-07-09 LAB
AGE GDLN ACOG TESTING: NORMAL
CYTOLOGIST CVX/VAG CYTO: NORMAL
CYTOLOGY CVX/VAG DOC CYTO: NORMAL
CYTOLOGY CVX/VAG DOC THIN PREP: NORMAL
DX ICD CODE: NORMAL
HPV GENOTYPE REFLEX: NORMAL
HPV I/H RISK 4 DNA CVX QL PROBE+SIG AMP: NEGATIVE
Lab: NORMAL
OTHER STN SPEC: NORMAL
STAT OF ADQ CVX/VAG CYTO-IMP: NORMAL

## 2024-07-11 ENCOUNTER — PATIENT ROUNDING (BHMG ONLY) (OUTPATIENT)
Dept: FAMILY MEDICINE CLINIC | Facility: CLINIC | Age: 54
End: 2024-07-11
Payer: COMMERCIAL

## 2024-07-11 NOTE — PROGRESS NOTES
A My-Chart message has been sent to the patient for PATIENT ROUNDING with [facility].

## 2024-07-15 NOTE — PROGRESS NOTES
HEMATOLOGY ONCOLOGY OUTPATIENT CONSULTATION       Patient name: Nicki Amador  : 1970  MRN: [id number]  Primary Care Physician: Marisabel Mariee APRN  Referring Physician: Marisabel Mariee APRN  Reason For Consult:       History of Present Illness:  Patient is a 53 y.o. female who has been referred to us for further evaluation in view of significant family history of breast cancer.     Her family history is as follows:  Maternal side:   Mother: Right Sided breast ca. (64)  Maternal Aunt: Bone cancer? (At age 50s)  Uncle : Lung cancer (was a smoker)    Paternal side:   Uncle: Lung cancer (smoker)  Brother: Bladder cancer (in 70s), Lung cancer (5 yrs ago)      Patient reported that she is up to date with her Age appropriate cancer Screening.  Last Screening colonoscopy was about 2-3 years ago and was significant for presence of benign polyps, She has been recommended to undergo Colonoscopy every 3 years.    She has been undergoing Screening Mammogram every 2-3 years, She has history of fibrocystic disease, predominantly involving Right breast which was initially diagnosed about 10 years ago based on imaging. She denied every having undergone Breast biopsy but claims that she had Multiple needle aspirations for cystic disease. Last procedure was reportedly about 1 year ago.    She has undergone uterine ablation for menorrhagia in 2020 and is amenorrhic presently.    Past medical history iclude DLD, Right hip Bursitis, Cervical Spine degenerative disease, Right sided Frozen shoulder requiring her to undergo intra-articular injections intermittently.   She is also doing home PT for these issues.    Smoker: 1/2 PPD presently, was on 2 PPD until 1 year ago.      Subjective:  Patient presents for initial consultation today. Her pertinent medical history is as above, denied any significant health issues or acute complaints presently.       Past Medical History:   Diagnosis Date    Allergic      Arthritis     Headache     Hyperlipidemia     Hypertension     Injury of neck     Shortness of breath 2020       Past Surgical History:   Procedure Laterality Date    INDUCED       TONSILLECTOMY      UTERINE SUSPENSION LAPAROSCOPIC           Current Outpatient Medications:     albuterol sulfate  (90 Base) MCG/ACT inhaler, Inhale 1-2 puffs., Disp: , Rfl:     atorvastatin (LIPITOR) 20 MG tablet, Take 1 tablet by mouth every night at bedtime., Disp: 90 tablet, Rfl: 1    cetirizine (zyrTEC) 10 MG tablet, Take 1 tablet by mouth Daily., Disp: , Rfl:     Cholecalciferol 25 MCG (1000 UT) tablet, Take 1 tablet by mouth 2 (Two) Times a Day., Disp: , Rfl:     ciclopirox (PENLAC) 8 % solution, Apply  topically to the appropriate area as directed Every Night., Disp: 6 mL, Rfl: 0    doxycycline (VIBRAMYICN) 100 MG tablet, TAKE 1 TABLET (100 MG TOTAL) BY MOUTH TWICE A DAY FOR 5 DAYS NOT FOR PREGNANCY OR LACTATION, Disp: , Rfl:     ibuprofen (ADVIL,MOTRIN) 200 MG tablet, Take 1 tablet by mouth Every 6 (Six) Hours As Needed for Mild Pain., Disp: , Rfl:     magnesium gluconate (MAGONATE) 500 MG tablet, Take 1 tablet by mouth Daily., Disp: , Rfl:     predniSONE (DELTASONE) 20 MG tablet, Take 2 tablets by mouth Daily., Disp: , Rfl:     Allergies   Allergen Reactions    Penicillins Itching and Rash       Family History   Problem Relation Age of Onset    Heart attack Mother     Breast cancer Mother     Brain cancer Mother     Lung cancer Mother     Heart attack Father     Lung cancer Brother         bladder cancer    Bone cancer Maternal Aunt     Lung cancer Paternal Uncle     Skin cancer Paternal Uncle        Cancer-related family history includes Bone cancer in her maternal aunt; Brain cancer in her mother; Breast cancer in her mother; Lung cancer in her brother, mother, and paternal uncle; Skin cancer in her paternal uncle.      Social History     Tobacco Use    Smoking status: Every Day     Current packs/day:  "0.50     Average packs/day: 0.5 packs/day for 30.0 years (15.0 ttl pk-yrs)     Types: Cigarettes     Passive exposure: Current    Smokeless tobacco: Never   Vaping Use    Vaping status: Never Used   Substance Use Topics    Alcohol use: Not Currently     Comment: socially     Drug use: Not Currently     Types: Marijuana     Comment: THC gummies     Social History     Social History Narrative    Not on file       ROS:   Review of Systems   Constitutional: Negative.    HENT: Negative.     Eyes: Negative.    Respiratory: Negative.     Cardiovascular: Negative.    Gastrointestinal: Negative.    Endocrine: Negative.    Genitourinary: Negative.    Musculoskeletal: Negative.    Skin: Negative.    Allergic/Immunologic: Negative.    Neurological: Negative.    Hematological: Negative.    Psychiatric/Behavioral: Negative.           Objective:    Vital Signs:  Vitals:    07/16/24 1454   BP: 121/87   Pulse: 90   SpO2: 98%   Weight: 70 kg (154 lb 6.4 oz)   Height: 160 cm (63\")   PainSc: 0-No pain     Body mass index is 27.35 kg/m².    ECOG  (0) Fully active, able to carry on all predisease performance without restriction    Physical Exam:   Physical Exam  Constitutional:       Appearance: Normal appearance. She is normal weight.   HENT:      Head: Normocephalic and atraumatic.      Right Ear: External ear normal.      Left Ear: External ear normal.      Nose: Nose normal.      Mouth/Throat:      Mouth: Mucous membranes are moist.      Pharynx: Oropharynx is clear.   Eyes:      Extraocular Movements: Extraocular movements intact.      Conjunctiva/sclera: Conjunctivae normal.      Pupils: Pupils are equal, round, and reactive to light.   Cardiovascular:      Rate and Rhythm: Normal rate.      Pulses: Normal pulses.   Pulmonary:      Effort: Pulmonary effort is normal.   Abdominal:      General: Abdomen is flat.      Palpations: Abdomen is soft.   Musculoskeletal:         General: Normal range of motion.      Cervical back: Normal " "range of motion and neck supple.   Skin:     General: Skin is warm.   Neurological:      Mental Status: She is alert.   Psychiatric:         Mood and Affect: Mood normal.         Behavior: Behavior normal.         Thought Content: Thought content normal.         Judgment: Judgment normal.         Lab Results - Last 18 Months   Lab Units 07/05/24  1026   WBC 10*3/mm3 7.99   HEMOGLOBIN g/dL 13.7   HEMATOCRIT % 40.9   PLATELETS 10*3/mm3 281   MCV fL 96.0     Lab Results - Last 18 Months   Lab Units 07/05/24  1026   SODIUM mmol/L 142   POTASSIUM mmol/L 4.4   CHLORIDE mmol/L 104   CO2 mmol/L 26.2   BUN mg/dL 9   CREATININE mg/dL 0.70   CALCIUM mg/dL 9.1   BILIRUBIN mg/dL 0.5   ALK PHOS U/L 69   ALT (SGPT) U/L 30   AST (SGOT) U/L 22   GLUCOSE mg/dL 93       Lab Results   Component Value Date    GLUCOSE 93 07/05/2024    BUN 9 07/05/2024    CREATININE 0.70 07/05/2024    BCR 12.9 07/05/2024    K 4.4 07/05/2024    CO2 26.2 07/05/2024    CALCIUM 9.1 07/05/2024    ALBUMIN 4.3 07/05/2024    AST 22 07/05/2024    ALT 30 07/05/2024       No results for input(s): \"APTT\", \"INR\", \"PTT\" in the last 45248 hours.    No results found for: \"IRON\", \"TIBC\", \"FERRITIN\"    No results found for: \"FOLATE\"    No results found for: \"OCCULTBLD\"    No results found for: \"RETICCTPCT\"  No results found for: \"ABQLNDXD65\"  No results found for: \"SPEP\", \"UPEP\"  No results found for: \"LDH\", \"URICACID\"  No results found for: \"VEE\", \"RF\", \"SEDRATE\"  No results found for: \"FIBRINOGEN\", \"HAPTOGLOBIN\"  No results found for: \"PTT\", \"INR\"  No results found for: \"\"  No results found for: \"CEA\"  No components found for: \"CA-19-9\"  No results found for: \"PSA\"  No results found for: \"SEDRATE\"       Assessment & Plan     High Risk breast Cancer:  -Family and personal history reviewed as above  -discussed with patient about environmental and genetic risk factors for malignancies. Explained to her that Reported family history is less concerning for a genetic " syndrome.  -her calculated lifetime breast cancer risk based on TC risk calculator is around 11.3% which is not substantially higher than normal females, however given history of breast cancer involving her mother and multiple other malignancies, will refer her to Genetic counsellor for further discussion and genetic testing. This was explained to the patient.  -She was counseled that if she were noted to have high risk genes for breast cancer (BRCA, PALB2, SOLITARIO, CHEK etc), she may be eligible for enhanced breast cancer screening,.  -no indication for chemoprophylaxis or prophylactic surgery at this time.     Risk Reduction:  Counseled on smoking cessation, avoiding alcohol use, healthy dietary habits and regular physical activity.  Counseled on continuing age appropriate cancer screening,       Fibrocystic disease of the breast:  The patient was explained that this condition is usually benign and is not associated with significantly increased risk of breast cancer compared to general population.  Will recommend ANNUAL breast cancer screening moving forward. This was explained to the patient in detail.     Smoker: Counseled on quitting smoking. Will order low dose CT chest for lung cancer screening.    2 month follow up to discuss further management. Sooner as needed.    Thank you very much for providing the opportunity to participate in this patient’s care. Please do not hesitate to call if there are any other questions.      I spent 45 minutes caring for the patient on this date of service. This time includes time spent by me in the following activities:preparing for the visit, reviewing tests, obtaining and/or reviewing a separately obtained history, performing a medically appropriate examination and/or evaluation , counseling and educating the patient/family/caregiver, ordering medications, tests, or procedures, documenting information in the medical record, and independently interpreting results and  communicating that information with the patient/family/caregiver

## 2024-07-16 ENCOUNTER — CONSULT (OUTPATIENT)
Dept: ONCOLOGY | Facility: CLINIC | Age: 54
End: 2024-07-16
Payer: COMMERCIAL

## 2024-07-16 VITALS
SYSTOLIC BLOOD PRESSURE: 121 MMHG | BODY MASS INDEX: 27.36 KG/M2 | HEIGHT: 63 IN | WEIGHT: 154.4 LBS | DIASTOLIC BLOOD PRESSURE: 87 MMHG | OXYGEN SATURATION: 98 % | HEART RATE: 90 BPM

## 2024-07-16 DIAGNOSIS — Z80.9 FAMILY HISTORY OF CANCER: ICD-10-CM

## 2024-07-16 DIAGNOSIS — Z87.891 ENCOUNTER FOR SCREENING FOR MALIGNANT NEOPLASM OF LUNG IN PATIENT WITH LESS THAN 30 PACK YEAR SMOKING HISTORY: Primary | ICD-10-CM

## 2024-07-16 DIAGNOSIS — F17.200 SMOKER: ICD-10-CM

## 2024-07-16 DIAGNOSIS — Z12.2 ENCOUNTER FOR SCREENING FOR MALIGNANT NEOPLASM OF LUNG IN PATIENT WITH LESS THAN 30 PACK YEAR SMOKING HISTORY: Primary | ICD-10-CM

## 2024-07-17 ENCOUNTER — PATIENT ROUNDING (BHMG ONLY) (OUTPATIENT)
Dept: ONCOLOGY | Facility: CLINIC | Age: 54
End: 2024-07-17
Payer: COMMERCIAL

## 2024-07-17 NOTE — PROGRESS NOTES
July 17, 2024    Hello, may I speak with Nicki Amador?    My name is Pilar Alfonso      I am  with [facility]  [address] [place] [address] [phone].    Before we get started may I verify your date of birth? 1970    I am calling to officially welcome you to our practice and ask about your recent visit. Is this a good time to talk? no    Tell me about your visit with us. What things went well?  A My Chart message was sent to the patient.         We're always looking for ways to make our patients' experiences even better. Do you have recommendations on ways we may improve?  no    Overall were you satisfied with your first visit to our practice? yes       I appreciate you taking the time to speak with me today. Is there anything else I can do for you? no      Thank you, and have a great day.

## 2024-07-22 DIAGNOSIS — Z71.6 ENCOUNTER FOR SMOKING CESSATION COUNSELING: Primary | ICD-10-CM

## 2024-07-22 RX ORDER — VARENICLINE TARTRATE 1 MG/1
1 TABLET, FILM COATED ORAL 2 TIMES DAILY
Qty: 56 TABLET | Refills: 1 | Status: SHIPPED | OUTPATIENT
Start: 2024-08-19 | End: 2024-10-14

## 2024-07-22 RX ORDER — VARENICLINE TARTRATE 0.5 (11)-1
KIT ORAL
Qty: 1 EACH | Refills: 0 | Status: SHIPPED | OUTPATIENT
Start: 2024-07-22 | End: 2024-08-19

## 2024-08-13 ENCOUNTER — LAB (OUTPATIENT)
Dept: LAB | Facility: HOSPITAL | Age: 54
End: 2024-08-13
Payer: COMMERCIAL

## 2024-08-13 ENCOUNTER — CLINICAL SUPPORT (OUTPATIENT)
Dept: GENETICS | Facility: HOSPITAL | Age: 54
End: 2024-08-13
Payer: COMMERCIAL

## 2024-08-13 DIAGNOSIS — Z80.42 FAMILY HISTORY OF PROSTATE CANCER: ICD-10-CM

## 2024-08-13 DIAGNOSIS — Z13.79 GENETIC TESTING: Primary | ICD-10-CM

## 2024-08-13 DIAGNOSIS — Z80.3 FAMILY HISTORY OF BREAST CANCER: ICD-10-CM

## 2024-08-13 DIAGNOSIS — Z80.1 FAMILY HISTORY OF LUNG CANCER: ICD-10-CM

## 2024-08-13 PROCEDURE — 96040: CPT | Performed by: GENETIC COUNSELOR, MS

## 2024-08-13 NOTE — PROGRESS NOTES
Nicki Amador, a 53-year-old female, was referred for genetic counseling due to a family history of breast cancer. Ms. Amador has no personal history of cancer. She was 14/15 years old at menarche and had her first child at age 20. She is zay-menopausal and retains her uterus and ovaries. Ms. Amador's most recent mammogram was in 2023 and showed heterogeneously dense breast tissue and a benign cyst in the right breast but was otherwise normal. She had a colonoscopy in earlier this year and reports a history of about 10 polyps. She was interested in discussing her risk for a hereditary cancer syndrome. Ms. Amador decided to pursue comprehensive genetic testing to evaluate her risk of cancer, therefore the CancerNext Expanded Panel was ordered through [employer] which analyzes 71 genes associated with an increased cancer risk. Results are expected in 2-3 weeks.     PERTINENT FAMILY HISTORY: (See attached pedigree)   Brother:  Bladder cancer, 71     Lung cancer, 65  Mother:  Breast cancer, 64     Lung cancer     Brain cancer  Mat Aunt 1:  Bone cancer     Breast cancer  Mat Aunt 2:  Lung cancer  Mat Aunt 3:  Cancer, unknown type  Mat Uncle 1:  Prostate cancer  Mat Uncle 2:  Lung cancer  Pat Uncle 1:  Lung cancer  Pat Uncle 2:  Skin cancer    We do not have medical records regarding any of these diagnoses.       RISK ASSESSMENT:  Ms. Amador's family history of breast cancer raises the question of a hereditary cancer syndrome. NCCN guidelines for genetic testing for BRCA1/2 states that individuals with 3 or more close relatives with breast or prostate cancer at any age may consider genetic testing. With Ms. Amador's mother's, maternal aunt's, and maternal uncle's diagnoses, she would meet this criteria. This risk assessment is based on the family history information provided at the time of the appointment. The assessment could change in the future should new information be obtained.    GENETIC COUNSELING (30 minutes):   We reviewed the family history information in detail. Cases of cancer follow three general patterns: sporadic, familial, and hereditary. While most cancer is sporadic, some cases appear to occur in family clusters. These cases are said to be familial and account for 10-20% of cancer cases. Familial cases may be due to a combination of shared genes and environmental factors among family members. In even fewer cases, the risk for cancer is inherited, and the genes responsible for the increased cancer risk are known.       Family histories typical of hereditary cancer syndromes usually include multiple first- and second-degree relatives diagnosed with cancer types that define a syndrome. These cases tend to be diagnosed at younger-than-expected ages and can be bilateral or multifocal. The cancer in these families follows an autosomal dominant inheritance pattern, which indicates the likely presence of a mutation in a cancer susceptibility gene. Children and siblings of an individual believed to carry this mutation have a 50% chance of inheriting that mutation, thereby inheriting the increased risk to develop cancer. These mutations can be passed down from the maternal or the paternal lineage.     Due to Ms. Amador's family history of breast cancer, we discussed hereditary breast cancer. Hereditary breast cancer accounts for 5-10% of all cases of breast cancer. A significant proportion (50%) of hereditary breast cancer can be attributed to mutations in the BRCA1 and BRCA2 genes. Mutations in these genes confer an increased risk for breast cancer, ovarian cancer, male breast cancer, prostate cancer, and pancreatic cancer. Women with a BRCA1 or BRCA2 mutation have over a 60% lifetime risk of breast cancer and up to a 58% risk of ovarian cancer. There are other clinically significant breast cancer related genes in addition to BRCA1/2, including PALB2, SOLITARIO, and CHEK2.     There are other hereditary cancer syndromes as well.  Based on Ms. Amador's family history and her desire to get more information regarding her personal risks, testing was pursued through a multigene panel evaluating several other genes known to increase the risk for cancer.    GENETIC TESTING:  The risks, benefits, and limitations of genetic testing and implications for clinical management following testing were reviewed. DNA test results can influence decisions regarding screening and prevention. Genetic testing can have significant psychological implications for both individuals and families. Also discussed was the possibility of employment and insurance discrimination based on genetic test results and the laws in place to prevent this, as well as the limitations of these laws.       We discussed panel testing, which would involve testing 71 genes associated with increased cancer risk. The implications of a positive or negative test result were discussed. We also discussed the importance of testing an affected relative and how a negative result for Ms. Amador wouldn't necessarily mean the cancers presenting in her family weren't due to a genetic mutation that Ms. Amador did not inherit. In general, a negative genetic test result is most informative if a mutation has first been established in an affected member of the family. In cases where an affected individual is not available or interested in testing, it is appropriate to offer testing to an unaffected individual. We discussed the possibility that, in some cases, genetic test results may be ambiguous due to the identification of a genetic variant. These variants may or may not be associated with an increased cancer risk. With multigene panel testing, it is not uncommon for a variant of uncertain significance (VUS) to be identified. If a VUS is identified, testing family members is typically not recommended and screening recommendations are made based on the family history. The laboratories that perform genetic  testing work to reclassify the VUS and send out an amended report if and when a VUS is reclassified. The majority of variant findings are ultimately reclassified to a negative result. Given Ms. Amador's family history, a negative test result does not eliminate all cancer risk, although the risk would not be as high as it would with positive genetic testing.     PLAN: Genetic testing was ordered via the [facility] Expanded Panel through [employer]. She had her blood drawn today at [facility]. Results are expected in 2-3 weeks and will be called out to Ms. Amador. If she has any questions in the meantime, she is welcome to call me at [phone].      Tiffanie Lopez MS, [facility] [place], C  Licensed Certified Genetic Counselor

## 2024-08-16 ENCOUNTER — HOSPITAL ENCOUNTER (OUTPATIENT)
Dept: CT IMAGING | Facility: HOSPITAL | Age: 54
Discharge: HOME | End: 2024-08-16
Admitting: STUDENT IN AN ORGANIZED HEALTH CARE EDUCATION/TRAINING PROGRAM
Payer: COMMERCIAL

## 2024-08-16 DIAGNOSIS — Z87.891 ENCOUNTER FOR SCREENING FOR MALIGNANT NEOPLASM OF LUNG IN PATIENT WITH LESS THAN 30 PACK YEAR SMOKING HISTORY: ICD-10-CM

## 2024-08-16 DIAGNOSIS — Z12.2 ENCOUNTER FOR SCREENING FOR MALIGNANT NEOPLASM OF LUNG IN PATIENT WITH LESS THAN 30 PACK YEAR SMOKING HISTORY: ICD-10-CM

## 2024-08-16 PROCEDURE — 71250 CT THORAX DX C-: CPT

## 2024-08-19 ENCOUNTER — HOSPITAL ENCOUNTER (OUTPATIENT)
Dept: MAMMOGRAPHY | Facility: HOSPITAL | Age: 54
Discharge: HOME | End: 2024-08-19
Admitting: NURSE PRACTITIONER
Payer: COMMERCIAL

## 2024-08-19 DIAGNOSIS — Z12.31 ENCOUNTER FOR SCREENING MAMMOGRAM FOR MALIGNANT NEOPLASM OF BREAST: ICD-10-CM

## 2024-08-19 PROCEDURE — 77067 SCR MAMMO BI INCL CAD: CPT

## 2024-08-19 PROCEDURE — 77063 BREAST TOMOSYNTHESIS BI: CPT

## 2024-08-27 ENCOUNTER — DOCUMENTATION (OUTPATIENT)
Dept: GENETICS | Facility: HOSPITAL | Age: 54
End: 2024-08-27
Payer: COMMERCIAL

## 2024-08-27 ENCOUNTER — TELEPHONE (OUTPATIENT)
Dept: GENETICS | Facility: HOSPITAL | Age: 54
End: 2024-08-27
Payer: COMMERCIAL

## 2024-08-27 NOTE — TELEPHONE ENCOUNTER
Spoke with patient and disclosed negative genetic results. Informed patient these results would be on [employer] and sent to her

## 2024-08-27 NOTE — PROGRESS NOTES
Nicki Amador, a 53-year-old female, was referred for genetic counseling due to a family history of breast cancer. Ms. Amador has no personal history of cancer. She was 14/15 years old at menarche and had her first child at age 20. She is zay-menopausal and retains her uterus and ovaries. Ms. Amador's most recent mammogram was in 2023 and showed heterogeneously dense breast tissue and a benign cyst in the right breast but was otherwise normal. She had a colonoscopy in earlier this year and reports a history of about 10 polyps. She was interested in discussing her risk for a hereditary cancer syndrome. Ms. Amador decided to pursue comprehensive genetic testing to evaluate her risk of cancer, therefore the CancerNext Expanded Panel was ordered through [employer] which analyzes 71 genes associated with an increased cancer risk. Genetic testing was negative for pathogenic mutations in BRCA1/2 and 69 additional genes included on this panel (see attached results). These normal results were discussed with Ms. Amador by telephone on 8/27/24.     PERTINENT FAMILY HISTORY: (See attached pedigree)   Brother:  Bladder cancer, 71     Lung cancer, 65  Mother:  Breast cancer, 64     Lung cancer     Brain cancer  Mat Aunt 1:  Bone cancer     Breast cancer  Mat Aunt 2:  Lung cancer  Mat Aunt 3:  Cancer, unknown type  Mat Uncle 1:  Prostate cancer  Mat Uncle 2:  Lung cancer  Pat Uncle 1:  Lung cancer  Pat Uncle 2:  Skin cancer    We do not have medical records regarding any of these diagnoses.       RISK ASSESSMENT:  Ms. Amador's family history of breast cancer raises the question of a hereditary cancer syndrome. NCCN guidelines for genetic testing for BRCA1/2 states that individuals with 3 or more close relatives with breast or prostate cancer at any age may consider genetic testing. With Ms. Amador's mother's, maternal aunt's, and maternal uncle's diagnoses, she would meet this criteria. This risk assessment is based on the  family history information provided at the time of the appointment. The assessment could change in the future should new information be obtained.    At this time, Ms. Amador's management should be guided by a family history-based risk assessment. Tyrer-Cuzick, version 8 is able to take into account personal factors (age at menarche, age at first live birth, breast density, etc) and family history when calculating risk for breast cancer. Computer modeling estimates that Ms. Honeycutts lifetime personal risk for developing breast cancer is up to 17.2% (Voner-Cuzick, v8), compared to the average female's risk of 12.5%. In general, a lifetime risk above 20% is considered to be “high risk” where increased screening is warranted; Ms. Honeycutts risk does not fall into that category. This risk assessment is based on the family history information provided at the time of the appointment and could change in the future should new information be obtained.    GENETIC COUNSELING (30 minutes):  We reviewed the family history information in detail. Cases of cancer follow three general patterns: sporadic, familial, and hereditary. While most cancer is sporadic, some cases appear to occur in family clusters. These cases are said to be familial and account for 10-20% of cancer cases. Familial cases may be due to a combination of shared genes and environmental factors among family members. In even fewer cases, the risk for cancer is inherited, and the genes responsible for the increased cancer risk are known.       Family histories typical of hereditary cancer syndromes usually include multiple first- and second-degree relatives diagnosed with cancer types that define a syndrome. These cases tend to be diagnosed at younger-than-expected ages and can be bilateral or multifocal. The cancer in these families follows an autosomal dominant inheritance pattern, which indicates the likely presence of a mutation in a cancer susceptibility gene.  Children and siblings of an individual believed to carry this mutation have a 50% chance of inheriting that mutation, thereby inheriting the increased risk to develop cancer. These mutations can be passed down from the maternal or the paternal lineage.     Due to Ms. Amador's family history of breast cancer, we discussed hereditary breast cancer. Hereditary breast cancer accounts for 5-10% of all cases of breast cancer. A significant proportion (50%) of hereditary breast cancer can be attributed to mutations in the BRCA1 and BRCA2 genes. Mutations in these genes confer an increased risk for breast cancer, ovarian cancer, male breast cancer, prostate cancer, and pancreatic cancer. Women with a BRCA1 or BRCA2 mutation have over a 60% lifetime risk of breast cancer and up to a 58% risk of ovarian cancer. There are other clinically significant breast cancer related genes in addition to BRCA1/2, including PALB2, SOLITARIO, and CHEK2.     There are other hereditary cancer syndromes as well. Based on Ms. Amador's family history and her desire to get more information regarding her personal risks, testing was pursued through a multigene panel evaluating several other genes known to increase the risk for cancer.    GENETIC TESTING:  The risks, benefits, and limitations of genetic testing and implications for clinical management following testing were reviewed. DNA test results can influence decisions regarding screening and prevention. Genetic testing can have significant psychological implications for both individuals and families. Also discussed was the possibility of employment and insurance discrimination based on genetic test results and the laws in place to prevent this, as well as the limitations of these laws.       We discussed panel testing, which would involve testing 71 genes associated with increased cancer risk. The implications of a positive or negative test result were discussed. We also discussed the importance of  testing an affected relative and how a negative result for Ms. Amador wouldn't necessarily mean the cancers presenting in her family weren't due to a genetic mutation that Ms. Amador did not inherit. In general, a negative genetic test result is most informative if a mutation has first been established in an affected member of the family. In cases where an affected individual is not available or interested in testing, it is appropriate to offer testing to an unaffected individual. We discussed the possibility that, in some cases, genetic test results may be ambiguous due to the identification of a genetic variant. These variants may or may not be associated with an increased cancer risk. With multigene panel testing, it is not uncommon for a variant of uncertain significance (VUS) to be identified. If a VUS is identified, testing family members is typically not recommended and screening recommendations are made based on the family history. The laboratories that perform genetic testing work to reclassify the VUS and send out an amended report if and when a VUS is reclassified. The majority of variant findings are ultimately reclassified to a negative result. Given Ms. Amador's family history, a negative test result does not eliminate all cancer risk, although the risk would not be as high as it would with positive genetic testing.     TEST RESULTS:  Genetic testing was negative for pathogenic mutations by sequencing and rearrangement testing and RNA analysis for the 71 genes including BRCA1/2 on the CancerNext Expanded panel. The negative result greatly lowers the risk of a hereditary cancer syndrome for Ms. Amador. It is possible that the family history is due to a hereditary cancer syndrome which Ms. Amador did not happen to inherit. This assessment is based on the information provided at the time of the consultation.    CANCER PREVENTION:  Options available to individuals with an elevated lifetime risk for breast  and/or ovarian cancer were briefly discussed. Based on computer modeling, Ms. Amador's lifetime risk for breast cancer would not be considered “high risk” (>20%). Per NCCN guidelines, it is appropriate for her to follow general population screening guidelines for her breast cancer risk including annual clinical breast exam and annual mammography. These assessments are based on the information provided at the time of consultation.    PLAN: Genetic counseling remains available to Ms. Amador and her family. If she has any questions in the future, she is welcome to call me at [phone].     Tiffanie Lopez MS, [facility] [place], [facility]  Licensed Certified Genetic Counselor      Cc: Nicki Sequeira MD

## 2024-09-16 ENCOUNTER — OFFICE VISIT (OUTPATIENT)
Dept: ONCOLOGY | Facility: CLINIC | Age: 54
End: 2024-09-16
Payer: COMMERCIAL

## 2024-09-16 VITALS
SYSTOLIC BLOOD PRESSURE: 138 MMHG | HEIGHT: 64 IN | DIASTOLIC BLOOD PRESSURE: 83 MMHG | BODY MASS INDEX: 26.63 KG/M2 | TEMPERATURE: 98 F | HEART RATE: 87 BPM | OXYGEN SATURATION: 98 % | RESPIRATION RATE: 18 BRPM | WEIGHT: 156 LBS

## 2024-09-16 DIAGNOSIS — Z80.9 FAMILY HISTORY OF CANCER: ICD-10-CM

## 2024-09-16 DIAGNOSIS — Z87.891 ENCOUNTER FOR SCREENING FOR MALIGNANT NEOPLASM OF LUNG IN PATIENT WITH LESS THAN 30 PACK YEAR SMOKING HISTORY: Primary | ICD-10-CM

## 2024-09-16 DIAGNOSIS — Z12.2 ENCOUNTER FOR SCREENING FOR MALIGNANT NEOPLASM OF LUNG IN PATIENT WITH LESS THAN 30 PACK YEAR SMOKING HISTORY: Primary | ICD-10-CM

## 2024-09-16 DIAGNOSIS — F17.200 SMOKER: ICD-10-CM

## 2024-09-16 PROCEDURE — 99214 OFFICE O/P EST MOD 30 MIN: CPT | Performed by: STUDENT IN AN ORGANIZED HEALTH CARE EDUCATION/TRAINING PROGRAM

## 2024-09-30 DIAGNOSIS — Z71.6 ENCOUNTER FOR SMOKING CESSATION COUNSELING: ICD-10-CM

## 2024-09-30 RX ORDER — VARENICLINE TARTRATE 1 MG/1
1 TABLET, FILM COATED ORAL 2 TIMES DAILY
Qty: 56 TABLET | Refills: 1 | Status: SHIPPED | OUTPATIENT
Start: 2024-09-30 | End: 2024-11-25

## 2024-11-13 DIAGNOSIS — Z71.6 ENCOUNTER FOR SMOKING CESSATION COUNSELING: ICD-10-CM

## 2024-11-15 RX ORDER — VARENICLINE TARTRATE 0.5 (11)-1
KIT ORAL SEE ADMIN INSTRUCTIONS
Qty: 53 EACH | Refills: 0 | Status: SHIPPED | OUTPATIENT
Start: 2024-11-15

## 2025-03-21 DIAGNOSIS — E78.5 HYPERLIPIDEMIA, UNSPECIFIED HYPERLIPIDEMIA TYPE: ICD-10-CM

## 2025-03-21 RX ORDER — ATORVASTATIN CALCIUM 20 MG/1
20 TABLET, FILM COATED ORAL
Qty: 90 TABLET | Refills: 1 | Status: SHIPPED | OUTPATIENT
Start: 2025-03-21